# Patient Record
Sex: MALE | Race: WHITE | Employment: OTHER | URBAN - NONMETROPOLITAN AREA
[De-identification: names, ages, dates, MRNs, and addresses within clinical notes are randomized per-mention and may not be internally consistent; named-entity substitution may affect disease eponyms.]

---

## 2017-12-11 ENCOUNTER — OFFICE VISIT (OUTPATIENT)
Dept: OPHTHALMOLOGY | Age: 57
End: 2017-12-11
Payer: COMMERCIAL

## 2017-12-11 DIAGNOSIS — Z97.0 HISTORY OF EYE PROSTHESIS: ICD-10-CM

## 2017-12-11 DIAGNOSIS — H54.40: Primary | ICD-10-CM

## 2017-12-11 PROCEDURE — 99204 OFFICE O/P NEW MOD 45 MIN: CPT | Performed by: OPHTHALMOLOGY

## 2017-12-11 RX ORDER — BENOXINATE HCL/FLUORESCEIN SOD 0.4%-0.25%
1 DROPS OPHTHALMIC (EYE) ONCE
Status: COMPLETED | OUTPATIENT
Start: 2017-12-11 | End: 2017-12-11

## 2017-12-11 RX ORDER — LOVASTATIN 20 MG/1
20 TABLET ORAL
COMMUNITY
Start: 2017-10-31

## 2017-12-11 RX ORDER — LISINOPRIL 10 MG/1
10 TABLET ORAL
COMMUNITY
Start: 2017-10-31

## 2017-12-11 RX ORDER — TROPICAMIDE 10 MG/ML
1 SOLUTION/ DROPS OPHTHALMIC ONCE
Status: COMPLETED | OUTPATIENT
Start: 2017-12-11 | End: 2017-12-11

## 2017-12-11 RX ORDER — PHENYLEPHRINE HCL 2.5 %
1 DROPS OPHTHALMIC (EYE) ONCE
Status: COMPLETED | OUTPATIENT
Start: 2017-12-11 | End: 2017-12-11

## 2017-12-11 RX ADMIN — TROPICAMIDE 1 DROP: 10 SOLUTION/ DROPS OPHTHALMIC at 09:15

## 2017-12-11 RX ADMIN — Medication 1 DROP: at 08:51

## 2017-12-11 RX ADMIN — Medication 1 DROP: at 08:50

## 2017-12-11 ASSESSMENT — SLIT LAMP EXAM - LIDS: COMMENTS: NORMAL

## 2017-12-11 ASSESSMENT — CONF VISUAL FIELD
OD_INFERIOR_TEMPORAL_RESTRICTION: 1
OD_INFERIOR_NASAL_RESTRICTION: 1
OD_SUPERIOR_TEMPORAL_RESTRICTION: 1
METHOD: COUNTING FINGERS
OD_SUPERIOR_NASAL_RESTRICTION: 1
OS_NORMAL: 1

## 2017-12-11 ASSESSMENT — ENCOUNTER SYMPTOMS
GASTROINTESTINAL NEGATIVE: 1
RESPIRATORY NEGATIVE: 1

## 2017-12-11 ASSESSMENT — TONOMETRY
IOP_METHOD: APPLANATION W FLURESS DROP
OS_IOP_MMHG: 23

## 2017-12-11 ASSESSMENT — VISUAL ACUITY
METHOD: SNELLEN - LINEAR
OS_SC: 20/20
OD_SC: PROTHESIS

## 2017-12-11 NOTE — PATIENT INSTRUCTIONS
If you have any problems or concerns before your next scheduled appointment, please call the office at [phone].

## 2017-12-11 NOTE — PROGRESS NOTES
He is here for a complete exam.     Past Medical History:   Diagnosis Date    Cerebral artery occlusion with cerebral infarction [facility])           Current Outpatient Prescriptions:     lisinopril (PRINIVIL;ZESTRIL) 10 MG tablet, Take 10 mg by mouth, Disp: , Rfl:     lovastatin (MEVACOR) 20 MG tablet, Take 20 mg by mouth, Disp: , Rfl:     Neomycin-Polymyxin-Dexameth 0.1 % OINT, Place 0.5 inches into the right eye 2 times daily, Disp: 1 Tube, Rfl: 11     No Known Allergies   ROS  Review of Systems   Constitutional: Negative. HENT: Negative. Respiratory: Negative. Cardiovascular: Negative. Gastrointestinal: Negative. Musculoskeletal: Negative. Skin: Negative. Neurological: Negative. Endo/Heme/Allergies: Negative. Main Ophthalmology Exam     External Exam       Right Left    External Prothesis  Normal    OD    Conformer in orbit in good position. All conjunctival tissue healthy. No evidence of infection or tissue breakdown.            Slit Lamp Exam       Right Left    Lids/Lashes  Normal    Conjunctiva/Sclera  White and quiet    Cornea  Clear    Anterior Chamber  Deep and quiet    Iris  Round and reactive          Fundus Exam       Right Left    Disc  Normal    C/D Ratio  0.4    Macula  Normal    Vessels  AV nicking    Periphery  Normal                   Tonometry     Tonometry (Applanation w Fluress drop, 8:55 AM)       Right Left    Pressure N/A 23              Visual Acuity     Visual Acuity (Snellen - Linear)       Right Left    Dist sc prothesis 20/20              Pupils     Pupils       React APD    Right      Left Slow None               Confrontational Visual Fields     Visual Fields (Counting fingers)       Right Left      Full    Restrictions Total superior temporal, inferior temporal, superior nasal, inferior nasal deficiencies                Extraocular Movement     Extraocular Movement       Right Left     -- -- --   --  --   -- -- --    0 0 0   0  0   0 0 0 IMPRESSION:  1. Profound vision impairment, one eye     2. History of eye prosthesis         PLAN:  Discussed all below with patient. 1. Profound vision impairment, one eye  Stable    2. History of eye prosthesis  Stable    There are no Patient Instructions on file for this visit. No Follow-up on file.

## 2019-06-04 RX ORDER — OMEPRAZOLE 20 MG/1
20 CAPSULE, DELAYED RELEASE ORAL DAILY
COMMUNITY

## 2019-06-10 ENCOUNTER — INITIAL CONSULT (OUTPATIENT)
Dept: SURGERY | Age: 59
End: 2019-06-10
Payer: COMMERCIAL

## 2019-06-10 VITALS
HEART RATE: 102 BPM | TEMPERATURE: 98.8 F | SYSTOLIC BLOOD PRESSURE: 140 MMHG | HEIGHT: 69 IN | DIASTOLIC BLOOD PRESSURE: 80 MMHG | WEIGHT: 167 LBS | BODY MASS INDEX: 24.73 KG/M2

## 2019-06-10 DIAGNOSIS — D49.0 PANCREATIC NEOPLASM: ICD-10-CM

## 2019-06-10 DIAGNOSIS — K85.20 ALCOHOL-INDUCED ACUTE PANCREATITIS WITHOUT INFECTION OR NECROSIS: Primary | ICD-10-CM

## 2019-06-10 PROCEDURE — 99204 OFFICE O/P NEW MOD 45 MIN: CPT | Performed by: SURGERY

## 2019-06-10 RX ORDER — ASPIRIN 325 MG
325 TABLET ORAL DAILY
Status: ON HOLD | COMMUNITY
End: 2022-07-24 | Stop reason: HOSPADM

## 2019-06-10 SDOH — HEALTH STABILITY: MENTAL HEALTH: HOW MANY STANDARD DRINKS CONTAINING ALCOHOL DO YOU HAVE ON A TYPICAL DAY?: 5 OR 6

## 2019-06-10 SDOH — HEALTH STABILITY: MENTAL HEALTH: HOW OFTEN DO YOU HAVE A DRINK CONTAINING ALCOHOL?: 4 OR MORE TIMES A WEEK

## 2019-06-10 ASSESSMENT — ENCOUNTER SYMPTOMS
ABDOMINAL DISTENTION: 0
TROUBLE SWALLOWING: 0
DIARRHEA: 0
BACK PAIN: 0
SORE THROAT: 0
ABDOMINAL PAIN: 1
COUGH: 0
NAUSEA: 0
VOICE CHANGE: 0
EYES NEGATIVE: 1
VOMITING: 0
WHEEZING: 0
SHORTNESS OF BREATH: 0
CONSTIPATION: 1
HEMATOCHEZIA: 0
BLOOD IN STOOL: 0
CHOKING: 0
CHEST TIGHTNESS: 0
ANAL BLEEDING: 0

## 2019-06-10 ASSESSMENT — CROHNS DISEASE ACTIVITY INDEX (CDAI): CDAI SCORE: 0

## 2019-06-10 NOTE — PROGRESS NOTES
Subjective:      Patient ID: Abel Light is a 61 y.o. male. Abdominal Pain   This is a new problem. The current episode started 1 to 4 weeks ago. The problem occurs constantly. The most recent episode lasted 1 week. The pain is located in the epigastric region. The pain is at a severity of 5/10. The quality of the pain is a sensation of fullness. The abdominal pain radiates to the back. Associated symptoms include anorexia, constipation and weight loss. Pertinent negatives include no arthralgias, diarrhea, dysuria, fever, headaches, hematochezia, hematuria, melena, myalgias, nausea or vomiting. He has tried proton pump inhibitors (\"soda water\") for the symptoms. The treatment provided no relief. Prior diagnostic workup includes CT scan and ultrasound. There is no history of abdominal surgery, colon cancer, Crohn's disease, gallstones, GERD, irritable bowel syndrome, pancreatitis, PUD or ulcerative colitis. He was not on omeprazole until the pain started. Past Medical History:   Diagnosis Date    [facility]erebral artery occlusion with cerebral infarction ([facility] [address]     History of alcohol use     Hyperlipidemia     Hypertension     Substance abuse ([facility] [address]     Tobacco abuse      Past Surgical History:   Procedure Laterality Date    EYE SURGERY       Current Outpatient Medications   Medication Sig Dispense Refill    aspirin 325 MG tablet Take 325 mg by mouth daily      Cholecalciferol (VITAMIN D3) 1000 units CAPS Take 1,000 Units by mouth daily      Coenzyme Q10 (CO Q 10) 100 MG CAPS Take 400 mg by mouth daily      lisinopril (PRINIVIL;ZESTRIL) 10 MG tablet Take 10 mg by mouth      lovastatin (MEVACOR) 20 MG tablet Take 20 mg by mouth      omeprazole (PRILOSEC) 20 MG delayed release capsule Take 20 mg by mouth daily      Neomycin-Polymyxin-Dexameth 0.1 % OINT Place 0.5 inches into the right eye 2 times daily 1 Tube 11     No current facility-administered medications for this visit.       No Known are normal. Abnormal dentition ( edentulous). Tonsils are 1+ on the right. Tonsils are 1+ on the left. Eyes: Left eye exhibits no chemosis, no discharge, no exudate and no hordeolum. No foreign body present in the left eye. Left conjunctiva is not injected. Left conjunctiva has no hemorrhage. Left eye exhibits normal extraocular motion and no nystagmus. Left pupil is round and reactive. Pupils are unequal.       Neck: Normal range of motion. Neck supple. No tracheal deviation present. No thyromegaly present. Cardiovascular: Normal rate, regular rhythm and normal heart sounds. Pulmonary/Chest: Effort normal and breath sounds normal. No respiratory distress. Abdominal: Soft. Bowel sounds are normal. He exhibits no distension and no mass. There is no tenderness. There is no rebound and no guarding. No hernia. Lymphadenopathy:     He has no cervical adenopathy. He has no axillary adenopathy. Right: No inguinal and no supraclavicular adenopathy present. Left: No inguinal and no supraclavicular adenopathy present. Neurological: He is alert and oriented to person, place, and time. Skin: Skin is warm and dry. He is not diaphoretic. Psychiatric: He has a normal mood and affect. His behavior is normal. Judgment and thought content normal.     Imaging results and lab results from [facility] review    Assessment:      1)History of mild pancreatitis - likely alcoholic pancreatitis in absence of gall stone. He is on lisinopril and lovastatin, which could be associated with pancreatitis. Given his history of alcohol consumption, alcoholic pancreatitis is much more likely. He does not have gall stones. 2) Pseudocyst vs pancreatitic mass seen on CT scan      Plan:      1) MRCP - to evaluate mass vs pseudocyst.  If it is a pseudocyst, it is small and likely to resolve.   If there is still a question of this being a pancreatic tumor will need endoscopic ultrasound and possible biopsy  2) His best strategy to avoid future problems is to continue to abstain from alcohol. Recurrent episodes of pancreatitis can lead to chronic pancreatitis which in turn can be associated with difficult to control chronic pain and exocrine pancreatic insufficiency. This was explained to him and his sisters in lay terms. 3) I don't believe an EGD will be helpful at this time.         Zulema Bang MD

## 2019-06-11 DIAGNOSIS — Z13.89 ENCOUNTER FOR IMAGING TO SCREEN FOR METAL PRIOR TO MAGNETIC RESONANCE IMAGING (MRI): Primary | ICD-10-CM

## 2019-06-11 NOTE — PROGRESS NOTES
Patient is scheduled for MRCP, per dr Melania Nieto, at [facility] Saturday 6/15/2019 @ 715 a.m. With npo for 6 hours prior, patient verbalized understanding, all his paperwork faxed to [phone].

## 2019-06-24 DIAGNOSIS — D49.0 PANCREATIC NEOPLASM: ICD-10-CM

## 2019-06-24 DIAGNOSIS — K85.20 ALCOHOL-INDUCED ACUTE PANCREATITIS WITHOUT INFECTION OR NECROSIS: ICD-10-CM

## 2022-07-22 ENCOUNTER — APPOINTMENT (OUTPATIENT)
Dept: CT IMAGING | Age: 62
End: 2022-07-22
Payer: COMMERCIAL

## 2022-07-22 ENCOUNTER — APPOINTMENT (OUTPATIENT)
Dept: GENERAL RADIOLOGY | Age: 62
End: 2022-07-22
Payer: COMMERCIAL

## 2022-07-22 ENCOUNTER — HOSPITAL ENCOUNTER (OUTPATIENT)
Age: 62
Setting detail: OBSERVATION
Discharge: HOME HEALTH | End: 2022-07-24
Attending: EMERGENCY MEDICINE | Admitting: INTERNAL MEDICINE
Payer: COMMERCIAL

## 2022-07-22 DIAGNOSIS — R61 DIAPHORESIS: ICD-10-CM

## 2022-07-22 DIAGNOSIS — E86.0 DEHYDRATION: ICD-10-CM

## 2022-07-22 DIAGNOSIS — R11.2 NAUSEA AND VOMITING, INTRACTABILITY OF VOMITING NOT SPECIFIED, UNSPECIFIED VOMITING TYPE: ICD-10-CM

## 2022-07-22 DIAGNOSIS — R53.1 GENERALIZED WEAKNESS: Primary | ICD-10-CM

## 2022-07-22 LAB
-: NORMAL
ABSOLUTE EOS #: 0.14 K/UL (ref 0–0.44)
ABSOLUTE IMMATURE GRANULOCYTE: 0.17 K/UL (ref 0–0.3)
ABSOLUTE LYMPH #: 1.57 K/UL (ref 1.1–3.7)
ABSOLUTE MONO #: 0.51 K/UL (ref 0.1–1.2)
ALBUMIN SERPL-MCNC: 4.2 G/DL (ref 3.5–5.2)
ALBUMIN/GLOBULIN RATIO: 1.4 (ref 1–2.5)
ALP BLD-CCNC: 69 U/L (ref 40–129)
ALT SERPL-CCNC: 15 U/L (ref 5–41)
AMPHETAMINE SCREEN URINE: NEGATIVE
AMYLASE: 86 U/L (ref 28–100)
ANION GAP SERPL CALCULATED.3IONS-SCNC: 18 MMOL/L (ref 9–17)
AST SERPL-CCNC: 12 U/L
BACTERIA: NORMAL
BARBITURATE SCREEN URINE: NEGATIVE
BASOPHILS # BLD: 1 % (ref 0–2)
BASOPHILS ABSOLUTE: 0.11 K/UL (ref 0–0.2)
BENZODIAZEPINE SCREEN, URINE: NEGATIVE
BILIRUB SERPL-MCNC: 0.15 MG/DL (ref 0.3–1.2)
BILIRUBIN DIRECT: 0.08 MG/DL
BILIRUBIN URINE: NEGATIVE
BILIRUBIN, INDIRECT: 0.07 MG/DL (ref 0–1)
BUN BLDV-MCNC: 25 MG/DL (ref 8–23)
BUPRENORPHINE URINE: NEGATIVE
CALCIUM SERPL-MCNC: 9.3 MG/DL (ref 8.6–10.4)
CANNABINOID SCREEN URINE: NEGATIVE
CHLORIDE BLD-SCNC: 102 MMOL/L (ref 98–107)
CO2: 18 MMOL/L (ref 20–31)
COCAINE METABOLITE, URINE: NEGATIVE
CREAT SERPL-MCNC: 1.44 MG/DL (ref 0.7–1.2)
EOSINOPHILS RELATIVE PERCENT: 1 % (ref 1–4)
EPITHELIAL CELLS UA: NORMAL /HPF (ref 0–5)
GFR AFRICAN AMERICAN: >60 ML/MIN
GFR NON-AFRICAN AMERICAN: 50 ML/MIN
GFR SERPL CREATININE-BSD FRML MDRD: ABNORMAL ML/MIN/{1.73_M2}
GLUCOSE BLD-MCNC: 146 MG/DL (ref 70–99)
GLUCOSE URINE: NEGATIVE
HCT VFR BLD CALC: 41 % (ref 40.7–50.3)
HEMOGLOBIN: 13.5 G/DL (ref 13–17)
IMMATURE GRANULOCYTES: 1 %
KETONES, URINE: ABNORMAL
LACTIC ACID, SEPSIS: 1.7 MMOL/L (ref 0.5–1.9)
LACTIC ACID, SEPSIS: 3.6 MMOL/L (ref 0.5–1.9)
LEUKOCYTE ESTERASE, URINE: NEGATIVE
LIPASE: 25 U/L (ref 13–60)
LYMPHOCYTES # BLD: 11 % (ref 24–43)
MCH RBC QN AUTO: 30.5 PG (ref 25.2–33.5)
MCHC RBC AUTO-ENTMCNC: 32.9 G/DL (ref 25.2–33.5)
MCV RBC AUTO: 92.8 FL (ref 82.6–102.9)
METHADONE SCREEN, URINE: NEGATIVE
METHAMPHETAMINE, URINE: NEGATIVE
MONOCYTES # BLD: 4 % (ref 3–12)
NITRITE, URINE: NEGATIVE
NRBC AUTOMATED: 0 PER 100 WBC
OPIATES, URINE: NEGATIVE
OXYCODONE SCREEN URINE: NEGATIVE
PDW BLD-RTO: 13.2 % (ref 11.8–14.4)
PH UA: 5.5 (ref 5–6)
PHENCYCLIDINE, URINE: NEGATIVE
PLATELET # BLD: 196 K/UL (ref 138–453)
PMV BLD AUTO: 10.8 FL (ref 8.1–13.5)
POTASSIUM SERPL-SCNC: 4 MMOL/L (ref 3.7–5.3)
PROPOXYPHENE, URINE: NEGATIVE
PROTEIN UA: NEGATIVE
RBC # BLD: 4.42 M/UL (ref 4.21–5.77)
RBC UA: NORMAL /HPF (ref 0–4)
REASON FOR REJECTION: NORMAL
REASON FOR REJECTION: NORMAL
SARS-COV-2, RAPID: NOT DETECTED
SEG NEUTROPHILS: 82 % (ref 36–65)
SEGMENTED NEUTROPHILS ABSOLUTE COUNT: 11.28 K/UL (ref 1.5–8.1)
SODIUM BLD-SCNC: 138 MMOL/L (ref 135–144)
SPECIFIC GRAVITY UA: 1.02 (ref 1.01–1.02)
SPECIMEN DESCRIPTION: NORMAL
TEST INFORMATION: NORMAL
TOTAL PROTEIN: 7.2 G/DL (ref 6.4–8.3)
TRICYCLIC ANTIDEPRESSANTS, UR: NEGATIVE
TROPONIN, HIGH SENSITIVITY: 7 NG/L (ref 0–22)
TROPONIN, HIGH SENSITIVITY: 7 NG/L (ref 0–22)
URINE HGB: ABNORMAL
UROBILINOGEN, URINE: NORMAL
WBC # BLD: 13.8 K/UL (ref 3.5–11.3)
WBC UA: NORMAL /HPF (ref 0–4)
ZZ NTE CLEAN UP: ORDERED TEST: NORMAL
ZZ NTE CLEAN UP: ORDERED TEST: NORMAL
ZZ NTE WITH NAME CLEAN UP: SPECIMEN SOURCE: NORMAL
ZZ NTE WITH NAME CLEAN UP: SPECIMEN SOURCE: NORMAL

## 2022-07-22 PROCEDURE — 96374 THER/PROPH/DIAG INJ IV PUSH: CPT

## 2022-07-22 PROCEDURE — 82150 ASSAY OF AMYLASE: CPT

## 2022-07-22 PROCEDURE — 6360000002 HC RX W HCPCS: Performed by: EMERGENCY MEDICINE

## 2022-07-22 PROCEDURE — 80143 DRUG ASSAY ACETAMINOPHEN: CPT

## 2022-07-22 PROCEDURE — 84484 ASSAY OF TROPONIN QUANT: CPT

## 2022-07-22 PROCEDURE — 96361 HYDRATE IV INFUSION ADD-ON: CPT

## 2022-07-22 PROCEDURE — 70450 CT HEAD/BRAIN W/O DYE: CPT

## 2022-07-22 PROCEDURE — 6360000004 HC RX CONTRAST MEDICATION: Performed by: EMERGENCY MEDICINE

## 2022-07-22 PROCEDURE — 36415 COLL VENOUS BLD VENIPUNCTURE: CPT

## 2022-07-22 PROCEDURE — G0480 DRUG TEST DEF 1-7 CLASSES: HCPCS

## 2022-07-22 PROCEDURE — 87635 SARS-COV-2 COVID-19 AMP PRB: CPT

## 2022-07-22 PROCEDURE — 81001 URINALYSIS AUTO W/SCOPE: CPT

## 2022-07-22 PROCEDURE — 85025 COMPLETE CBC W/AUTO DIFF WBC: CPT

## 2022-07-22 PROCEDURE — 80179 DRUG ASSAY SALICYLATE: CPT

## 2022-07-22 PROCEDURE — 99285 EMERGENCY DEPT VISIT HI MDM: CPT

## 2022-07-22 PROCEDURE — 2580000003 HC RX 258: Performed by: EMERGENCY MEDICINE

## 2022-07-22 PROCEDURE — 2709999900 CT ABDOMEN PELVIS W IV CONTRAST

## 2022-07-22 PROCEDURE — 80306 DRUG TEST PRSMV INSTRMNT: CPT

## 2022-07-22 PROCEDURE — 80307 DRUG TEST PRSMV CHEM ANLYZR: CPT

## 2022-07-22 PROCEDURE — 93005 ELECTROCARDIOGRAM TRACING: CPT | Performed by: EMERGENCY MEDICINE

## 2022-07-22 PROCEDURE — 80053 COMPREHEN METABOLIC PANEL: CPT

## 2022-07-22 PROCEDURE — 71045 X-RAY EXAM CHEST 1 VIEW: CPT

## 2022-07-22 PROCEDURE — 82248 BILIRUBIN DIRECT: CPT

## 2022-07-22 PROCEDURE — 83690 ASSAY OF LIPASE: CPT

## 2022-07-22 PROCEDURE — 83605 ASSAY OF LACTIC ACID: CPT

## 2022-07-22 RX ORDER — 0.9 % SODIUM CHLORIDE 0.9 %
1000 INTRAVENOUS SOLUTION INTRAVENOUS ONCE
Status: COMPLETED | OUTPATIENT
Start: 2022-07-22 | End: 2022-07-22

## 2022-07-22 RX ORDER — NICOTINE 21 MG/24HR
1 PATCH, TRANSDERMAL 24 HOURS TRANSDERMAL DAILY
Status: DISCONTINUED | OUTPATIENT
Start: 2022-07-22 | End: 2022-07-24 | Stop reason: HOSPADM

## 2022-07-22 RX ORDER — ONDANSETRON 2 MG/ML
4 INJECTION INTRAMUSCULAR; INTRAVENOUS ONCE
Status: COMPLETED | OUTPATIENT
Start: 2022-07-22 | End: 2022-07-22

## 2022-07-22 RX ADMIN — IOPAMIDOL 100 ML: 755 INJECTION, SOLUTION INTRAVENOUS at 21:06

## 2022-07-22 RX ADMIN — SODIUM CHLORIDE 1000 ML: 9 INJECTION, SOLUTION INTRAVENOUS at 17:42

## 2022-07-22 RX ADMIN — ONDANSETRON 4 MG: 2 INJECTION INTRAMUSCULAR; INTRAVENOUS at 19:55

## 2022-07-22 RX ADMIN — SODIUM CHLORIDE 1000 ML: 9 INJECTION, SOLUTION INTRAVENOUS at 20:55

## 2022-07-22 ASSESSMENT — ENCOUNTER SYMPTOMS
VOMITING: 1
SHORTNESS OF BREATH: 0
ABDOMINAL PAIN: 0
BLOOD IN STOOL: 0
WHEEZING: 0
SORE THROAT: 0
DIARRHEA: 1
TROUBLE SWALLOWING: 0
BACK PAIN: 0
CONSTIPATION: 0
NAUSEA: 1

## 2022-07-22 ASSESSMENT — PAIN - FUNCTIONAL ASSESSMENT: PAIN_FUNCTIONAL_ASSESSMENT: NONE - DENIES PAIN

## 2022-07-22 NOTE — ED PROVIDER NOTES
[id number] [facility]  eMERGENCY dEPARTMENT eNCOUnter      Pt Name: Joey Ballard  MRN: [id number]  [place] 1960  Date of evaluation: 7/22/2022      CHIEF COMPLAINT       Chief Complaint   Patient presents with    Emesis     Vomitting and diarrhea, very diaphoretic, started today         HISTORY OF PRESENT ILLNESS    Joey Ballard is a 58 y.o. male who presents chief complaint of nausea vomiting and diaphoretic patient went to the restaurant today he said he got very diaphoretic nausea vomiting had diarrhea no pain he has had a history of pancreatitis as well is the substance use in the past squad arrived he was very diaphoretic but vitals were good blood sugar was in the 140s      REVIEW OF SYSTEMS       Review of Systems   Constitutional:  Positive for diaphoresis and fatigue. Negative for chills and fever. HENT:  Negative for congestion, dental problem, sore throat and trouble swallowing. Eyes:  Negative for visual disturbance. Respiratory:  Negative for shortness of breath and wheezing. Cardiovascular:  Negative for chest pain, palpitations and leg swelling. Gastrointestinal:  Positive for diarrhea, nausea and vomiting. Negative for abdominal pain, blood in stool and constipation. Genitourinary:  Negative for difficulty urinating and dysuria. Musculoskeletal:  Negative for back pain, joint swelling and neck pain. Skin:  Negative for rash. Neurological:  Negative for dizziness, syncope, weakness and headaches. Hematological:  Negative for adenopathy. Does not bruise/bleed easily. Psychiatric/Behavioral:  Negative for confusion and suicidal ideas. PAST MEDICAL HISTORY    has a past medical history of Cerebral artery occlusion with cerebral infarction [facility]), History of alcohol use, Hyperlipidemia, Hypertension, Substance abuse ([address] and Tobacco abuse. SURGICAL HISTORY      has a past surgical history that includes eye surgery.     CURRENT MEDICATIONS       Previous Medications    ASPIRIN 325 MG TABLET    Take 325 mg by mouth daily    CHOLECALCIFEROL (VITAMIN D3) 1000 UNITS CAPS    Take 1,000 Units by mouth daily    COENZYME Q10 (CO Q 10) 100 MG CAPS    Take 400 mg by mouth daily    LISINOPRIL (PRINIVIL;ZESTRIL) 10 MG TABLET    Take 10 mg by mouth    LOVASTATIN (MEVACOR) 20 MG TABLET    Take 20 mg by mouth    NEOMYCIN-POLYMYXIN-DEXAMETH 0.1 % OINT    Place 0.5 inches into the right eye 2 times daily    OMEPRAZOLE (PRILOSEC) 20 MG DELAYED RELEASE CAPSULE    Take 20 mg by mouth daily       ALLERGIES     has No Known Allergies. FAMILY HISTORY     has no family status information on file. family history is not on file. SOCIAL HISTORY      reports that he has been smoking cigarettes. He started smoking about 44 years ago. He has never used smokeless tobacco. He reports current alcohol use of about 35.0 standard drinks per week. He reports that he does not use drugs. PHYSICAL EXAM     INITIAL VITALS:  height is 5' 9\" (1.753 m) and weight is 150 lb (68 kg). His temperature is 96.2 °F (35.7 °C) (abnormal). His blood pressure is 144/77 (abnormal) and his pulse is 64. His respiration is 18 and oxygen saturation is 99%. Physical Exam  Constitutional:       Appearance: He is well-developed. He is ill-appearing and diaphoretic. HENT:      Head: Normocephalic and atraumatic. Right Ear: External ear normal.      Left Ear: External ear normal.   Eyes:      Pupils: Pupils are equal, round, and reactive to light. Cardiovascular:      Rate and Rhythm: Normal rate and regular rhythm. Pulses: Normal pulses. Heart sounds: Normal heart sounds. Pulmonary:      Effort: Pulmonary effort is normal.      Breath sounds: Normal breath sounds. Abdominal:      General: Bowel sounds are normal. There is no distension. Palpations: Abdomen is soft. Tenderness: There is no abdominal tenderness. Musculoskeletal:         General: Normal range of motion. Cervical back: Normal range of motion and neck supple. Skin:     General: Skin is warm. Neurological:      General: No focal deficit present. Mental Status: He is alert and oriented to person, place, and time. Psychiatric:         Behavior: Behavior normal.           DIFFERENTIAL DIAGNOSIS/ MDM:     Vomiting diarrhea diaphoresis we will treat symptomatically cardiac work-up    DIAGNOSTIC RESULTS     EKG: All EKG's are interpreted by the Emergency Department Physician who either signs or Co-signs this chart in the absence of a cardiologist.  Sinus bradycardia rate of 55 bpm GA was 166 ms QRS durations 80 ms QT corrected 431 ms axis for the QRS is 37 there is no acute ST or T wave changes seen      RADIOLOGY:   I directly visualized the following  images and reviewed the radiologist interpretations:          ED BEDSIDE ULTRASOUND:       LABS:  Labs Reviewed   CBC WITH AUTO DIFFERENTIAL - Abnormal; Notable for the following components:       Result Value    WBC 13.8 (*)     Seg Neutrophils 82 (*)     Lymphocytes 11 (*)     Immature Granulocytes 1 (*)     Segs Absolute 11.28 (*)     All other components within normal limits   LACTATE, SEPSIS - Abnormal; Notable for the following components:    Lactic Acid, Sepsis 3.6 (*)     All other components within normal limits   COMPREHENSIVE METABOLIC W/ BILI PROFILE W/ REFLEX TO MG - Abnormal; Notable for the following components:     Total Bilirubin 0.15 (*)     BUN 25 (*)     Creatinine 1.44 (*)     Glucose 146 (*)     CO2 18 (*)     Anion Gap 18 (*)     GFR Non- 50 (*)     All other components within normal limits   TOX SCR, BLD, ED - Abnormal; Notable for the following components:    Acetaminophen Level <5 (*)     Ethanol 18 (*)     Salicylate Lvl <1 (*)     All other components within normal limits   COVID-19, RAPID   SPECIMEN REJECTION   AMYLASE   LIPASE   TROPONIN   LACTATE, SEPSIS   URINALYSIS WITH REFLEX TO CULTURE   URINE DRUG SCREEN TROPONIN       Elevated lactic acid    EMERGENCY DEPARTMENT COURSE:   Vitals:    Vitals:    07/22/22 1731 07/22/22 1734   BP:  (!) 144/77   Pulse: 64    Resp: 18    Temp:  (!) 96.2 °F (35.7 °C)   SpO2: 99%    Weight: 150 lb (68 kg)    Height: 5' 9\" (1.753 m)      -------------------------  BP: (!) 144/77, Temp: (!) 96.2 °F (35.7 °C), Heart Rate: 64, Resp: 18        Re-evaluation Notes    Resting comfortably    CRITICAL CARE:   None        CONSULTS:      PROCEDURES:  None    FINAL IMPRESSION    No diagnosis found. DISPOSITION/PLAN   DISPOSITION   Care is passed to the oncoming physician at the end of my shift 1930 hrs. Condition on Disposition    Stable    PATIENT REFERRED TO:  No follow-up provider specified. DISCHARGE MEDICATIONS:  New Prescriptions    No medications on file       (Please note that portions of this note were completed with a voice recognition program.  Efforts were made to edit the dictations but occasionally words are mis-transcribed.)    Nii Aragon MD,, MD, F.A.A.E.M.   Attending Emergency Physician                           Nii Aragon MD  07/22/22 0136

## 2022-07-23 PROBLEM — I63.50 CEREBRAL ARTERY OCCLUSION WITH CEREBRAL INFARCTION (HCC): Status: ACTIVE | Noted: 2022-07-23

## 2022-07-23 PROBLEM — K85.90 ACUTE PANCREATITIS: Status: RESOLVED | Noted: 2019-08-21 | Resolved: 2022-07-23

## 2022-07-23 PROBLEM — Z97.0 PROSTHETIC EYE GLOBE: Status: ACTIVE | Noted: 2022-07-23

## 2022-07-23 PROBLEM — K44.9 HIATAL HERNIA: Status: ACTIVE | Noted: 2022-07-23

## 2022-07-23 PROBLEM — N18.30 CKD (CHRONIC KIDNEY DISEASE) STAGE 3, GFR 30-59 ML/MIN (HCC): Status: ACTIVE | Noted: 2022-07-23

## 2022-07-23 PROBLEM — E11.22 TYPE 2 DIABETES MELLITUS WITH STAGE 3 CHRONIC KIDNEY DISEASE, WITH LONG-TERM CURRENT USE OF INSULIN (HCC): Status: ACTIVE | Noted: 2022-07-23

## 2022-07-23 PROBLEM — F10.20 ALCOHOLISM (HCC): Status: ACTIVE | Noted: 2022-07-23

## 2022-07-23 PROBLEM — K85.90 ACUTE PANCREATITIS: Status: ACTIVE | Noted: 2019-08-21

## 2022-07-23 PROBLEM — N18.30 TYPE 2 DIABETES MELLITUS WITH STAGE 3 CHRONIC KIDNEY DISEASE, WITH LONG-TERM CURRENT USE OF INSULIN (HCC): Status: ACTIVE | Noted: 2022-07-23

## 2022-07-23 PROBLEM — R11.2 INTRACTABLE VOMITING WITH NAUSEA: Status: ACTIVE | Noted: 2022-07-23

## 2022-07-23 PROBLEM — F41.9 ANXIETY: Status: ACTIVE | Noted: 2022-07-23

## 2022-07-23 PROBLEM — Z79.4 TYPE 2 DIABETES MELLITUS WITH STAGE 3 CHRONIC KIDNEY DISEASE, WITH LONG-TERM CURRENT USE OF INSULIN (HCC): Status: ACTIVE | Noted: 2022-07-23

## 2022-07-23 PROBLEM — K86.0 ALCOHOL-INDUCED CHRONIC PANCREATITIS (HCC): Status: ACTIVE | Noted: 2022-07-23

## 2022-07-23 PROBLEM — K21.00 GASTROESOPHAGEAL REFLUX DISEASE WITH ESOPHAGITIS: Status: ACTIVE | Noted: 2022-07-23

## 2022-07-23 LAB
ABSOLUTE EOS #: <0.03 K/UL (ref 0–0.44)
ABSOLUTE IMMATURE GRANULOCYTE: 0.03 K/UL (ref 0–0.3)
ABSOLUTE LYMPH #: 0.83 K/UL (ref 1.1–3.7)
ABSOLUTE MONO #: 0.22 K/UL (ref 0.1–1.2)
ACETAMINOPHEN LEVEL: <5 UG/ML (ref 10–30)
ANION GAP SERPL CALCULATED.3IONS-SCNC: 14 MMOL/L (ref 9–17)
BASOPHILS # BLD: 0 % (ref 0–2)
BASOPHILS ABSOLUTE: 0.03 K/UL (ref 0–0.2)
BUN BLDV-MCNC: 23 MG/DL (ref 8–23)
BUN/CREAT BLD: 17 (ref 9–20)
CALCIUM SERPL-MCNC: 9.1 MG/DL (ref 8.6–10.4)
CHLORIDE BLD-SCNC: 104 MMOL/L (ref 98–107)
CO2: 21 MMOL/L (ref 20–31)
CREAT SERPL-MCNC: 1.35 MG/DL (ref 0.7–1.2)
EOSINOPHILS RELATIVE PERCENT: 0 % (ref 1–4)
ETHANOL: 18 MG/DL
GFR AFRICAN AMERICAN: >60 ML/MIN
GFR NON-AFRICAN AMERICAN: 54 ML/MIN
GFR SERPL CREATININE-BSD FRML MDRD: ABNORMAL ML/MIN/{1.73_M2}
GLUCOSE BLD-MCNC: 145 MG/DL (ref 75–110)
GLUCOSE BLD-MCNC: 164 MG/DL (ref 75–110)
GLUCOSE BLD-MCNC: 176 MG/DL (ref 75–110)
GLUCOSE BLD-MCNC: 222 MG/DL (ref 70–99)
HCT VFR BLD CALC: 38.1 % (ref 40.7–50.3)
HEMOGLOBIN: 13.3 G/DL (ref 13–17)
IMMATURE GRANULOCYTES: 0 %
LIPASE: 23 U/L (ref 13–60)
LYMPHOCYTES # BLD: 9 % (ref 24–43)
MCH RBC QN AUTO: 32 PG (ref 25.2–33.5)
MCHC RBC AUTO-ENTMCNC: 34.9 G/DL (ref 25.2–33.5)
MCV RBC AUTO: 91.8 FL (ref 82.6–102.9)
MONOCYTES # BLD: 3 % (ref 3–12)
NRBC AUTOMATED: 0 PER 100 WBC
PDW BLD-RTO: 13.1 % (ref 11.8–14.4)
PLATELET # BLD: 178 K/UL (ref 138–453)
PMV BLD AUTO: 11.2 FL (ref 8.1–13.5)
POTASSIUM SERPL-SCNC: 4.4 MMOL/L (ref 3.7–5.3)
RBC # BLD: 4.15 M/UL (ref 4.21–5.77)
SALICYLATE LEVEL: <1 MG/DL (ref 3–10)
SEG NEUTROPHILS: 88 % (ref 36–65)
SEGMENTED NEUTROPHILS ABSOLUTE COUNT: 7.79 K/UL (ref 1.5–8.1)
SODIUM BLD-SCNC: 139 MMOL/L (ref 135–144)
TOXIC TRICYCLIC SC,BLOOD: NEGATIVE
WBC # BLD: 8.9 K/UL (ref 3.5–11.3)

## 2022-07-23 PROCEDURE — 2580000003 HC RX 258: Performed by: NURSE PRACTITIONER

## 2022-07-23 PROCEDURE — 6370000000 HC RX 637 (ALT 250 FOR IP): Performed by: NURSE PRACTITIONER

## 2022-07-23 PROCEDURE — 99219 PR INITIAL OBSERVATION CARE/DAY 50 MINUTES: CPT | Performed by: NURSE PRACTITIONER

## 2022-07-23 PROCEDURE — 80048 BASIC METABOLIC PNL TOTAL CA: CPT

## 2022-07-23 PROCEDURE — 96376 TX/PRO/DX INJ SAME DRUG ADON: CPT

## 2022-07-23 PROCEDURE — 96375 TX/PRO/DX INJ NEW DRUG ADDON: CPT

## 2022-07-23 PROCEDURE — 94760 N-INVAS EAR/PLS OXIMETRY 1: CPT

## 2022-07-23 PROCEDURE — G0378 HOSPITAL OBSERVATION PER HR: HCPCS

## 2022-07-23 PROCEDURE — 6370000000 HC RX 637 (ALT 250 FOR IP): Performed by: INTERNAL MEDICINE

## 2022-07-23 PROCEDURE — 6360000002 HC RX W HCPCS: Performed by: NURSE PRACTITIONER

## 2022-07-23 PROCEDURE — 96361 HYDRATE IV INFUSION ADD-ON: CPT

## 2022-07-23 PROCEDURE — 96372 THER/PROPH/DIAG INJ SC/IM: CPT

## 2022-07-23 PROCEDURE — 51702 INSERT TEMP BLADDER CATH: CPT

## 2022-07-23 PROCEDURE — 82947 ASSAY GLUCOSE BLOOD QUANT: CPT

## 2022-07-23 PROCEDURE — A4216 STERILE WATER/SALINE, 10 ML: HCPCS | Performed by: NURSE PRACTITIONER

## 2022-07-23 PROCEDURE — C9113 INJ PANTOPRAZOLE SODIUM, VIA: HCPCS | Performed by: NURSE PRACTITIONER

## 2022-07-23 PROCEDURE — 83690 ASSAY OF LIPASE: CPT

## 2022-07-23 PROCEDURE — 36415 COLL VENOUS BLD VENIPUNCTURE: CPT

## 2022-07-23 PROCEDURE — 85025 COMPLETE CBC W/AUTO DIFF WBC: CPT

## 2022-07-23 PROCEDURE — 6370000000 HC RX 637 (ALT 250 FOR IP): Performed by: EMERGENCY MEDICINE

## 2022-07-23 RX ORDER — POLYETHYLENE GLYCOL 3350 17 G/17G
17 POWDER, FOR SOLUTION ORAL DAILY PRN
Status: DISCONTINUED | OUTPATIENT
Start: 2022-07-23 | End: 2022-07-24 | Stop reason: HOSPADM

## 2022-07-23 RX ORDER — ALBUTEROL SULFATE 90 UG/1
2 AEROSOL, METERED RESPIRATORY (INHALATION) EVERY 6 HOURS PRN
Status: DISCONTINUED | OUTPATIENT
Start: 2022-07-23 | End: 2022-07-24 | Stop reason: HOSPADM

## 2022-07-23 RX ORDER — LISINOPRIL 5 MG/1
10 TABLET ORAL DAILY
Status: DISCONTINUED | OUTPATIENT
Start: 2022-07-23 | End: 2022-07-24 | Stop reason: HOSPADM

## 2022-07-23 RX ORDER — TAMSULOSIN HYDROCHLORIDE 0.4 MG/1
0.4 CAPSULE ORAL DAILY
Status: DISCONTINUED | OUTPATIENT
Start: 2022-07-23 | End: 2022-07-24 | Stop reason: HOSPADM

## 2022-07-23 RX ORDER — ONDANSETRON 4 MG/1
4 TABLET, ORALLY DISINTEGRATING ORAL EVERY 8 HOURS PRN
Status: DISCONTINUED | OUTPATIENT
Start: 2022-07-23 | End: 2022-07-24 | Stop reason: HOSPADM

## 2022-07-23 RX ORDER — DEXTROSE MONOHYDRATE 100 MG/ML
INJECTION, SOLUTION INTRAVENOUS CONTINUOUS PRN
Status: DISCONTINUED | OUTPATIENT
Start: 2022-07-23 | End: 2022-07-24 | Stop reason: HOSPADM

## 2022-07-23 RX ORDER — SODIUM CHLORIDE 9 MG/ML
25 INJECTION, SOLUTION INTRAVENOUS PRN
Status: DISCONTINUED | OUTPATIENT
Start: 2022-07-23 | End: 2022-07-24 | Stop reason: HOSPADM

## 2022-07-23 RX ORDER — LORAZEPAM 2 MG/ML
3 INJECTION INTRAMUSCULAR
Status: DISCONTINUED | OUTPATIENT
Start: 2022-07-23 | End: 2022-07-24 | Stop reason: HOSPADM

## 2022-07-23 RX ORDER — SODIUM CHLORIDE 0.9 % (FLUSH) 0.9 %
5-40 SYRINGE (ML) INJECTION EVERY 12 HOURS SCHEDULED
Status: DISCONTINUED | OUTPATIENT
Start: 2022-07-23 | End: 2022-07-24 | Stop reason: HOSPADM

## 2022-07-23 RX ORDER — SODIUM CHLORIDE 0.9 % (FLUSH) 0.9 %
5-40 SYRINGE (ML) INJECTION PRN
Status: DISCONTINUED | OUTPATIENT
Start: 2022-07-23 | End: 2022-07-24 | Stop reason: HOSPADM

## 2022-07-23 RX ORDER — ONDANSETRON 2 MG/ML
4 INJECTION INTRAMUSCULAR; INTRAVENOUS EVERY 6 HOURS PRN
Status: DISCONTINUED | OUTPATIENT
Start: 2022-07-23 | End: 2022-07-24 | Stop reason: HOSPADM

## 2022-07-23 RX ORDER — ENOXAPARIN SODIUM 100 MG/ML
40 INJECTION SUBCUTANEOUS DAILY
Status: DISCONTINUED | OUTPATIENT
Start: 2022-07-23 | End: 2022-07-24 | Stop reason: HOSPADM

## 2022-07-23 RX ORDER — LORAZEPAM 1 MG/1
4 TABLET ORAL
Status: DISCONTINUED | OUTPATIENT
Start: 2022-07-23 | End: 2022-07-24 | Stop reason: HOSPADM

## 2022-07-23 RX ORDER — INSULIN GLARGINE 100 [IU]/ML
5 INJECTION, SOLUTION SUBCUTANEOUS DAILY
Status: DISCONTINUED | OUTPATIENT
Start: 2022-07-23 | End: 2022-07-24 | Stop reason: HOSPADM

## 2022-07-23 RX ORDER — LORAZEPAM 2 MG/ML
1 INJECTION INTRAMUSCULAR
Status: DISCONTINUED | OUTPATIENT
Start: 2022-07-23 | End: 2022-07-24 | Stop reason: HOSPADM

## 2022-07-23 RX ORDER — INSULIN LISPRO 100 [IU]/ML
0-4 INJECTION, SOLUTION INTRAVENOUS; SUBCUTANEOUS NIGHTLY
Status: DISCONTINUED | OUTPATIENT
Start: 2022-07-23 | End: 2022-07-24 | Stop reason: HOSPADM

## 2022-07-23 RX ORDER — LORAZEPAM 1 MG/1
3 TABLET ORAL
Status: DISCONTINUED | OUTPATIENT
Start: 2022-07-23 | End: 2022-07-24 | Stop reason: HOSPADM

## 2022-07-23 RX ORDER — INSULIN LISPRO 100 [IU]/ML
0-8 INJECTION, SOLUTION INTRAVENOUS; SUBCUTANEOUS
Status: DISCONTINUED | OUTPATIENT
Start: 2022-07-23 | End: 2022-07-24 | Stop reason: HOSPADM

## 2022-07-23 RX ORDER — GLIMEPIRIDE 2 MG/1
1 TABLET ORAL
COMMUNITY
Start: 2022-05-31 | End: 2022-11-27

## 2022-07-23 RX ORDER — ACETAMINOPHEN 325 MG/1
650 TABLET ORAL EVERY 6 HOURS PRN
Status: DISCONTINUED | OUTPATIENT
Start: 2022-07-23 | End: 2022-07-24 | Stop reason: HOSPADM

## 2022-07-23 RX ORDER — SODIUM CHLORIDE 9 MG/ML
INJECTION, SOLUTION INTRAVENOUS CONTINUOUS
Status: DISCONTINUED | OUTPATIENT
Start: 2022-07-23 | End: 2022-07-24 | Stop reason: HOSPADM

## 2022-07-23 RX ORDER — LORAZEPAM 1 MG/1
2 TABLET ORAL
Status: DISCONTINUED | OUTPATIENT
Start: 2022-07-23 | End: 2022-07-24 | Stop reason: HOSPADM

## 2022-07-23 RX ORDER — PROMETHAZINE HYDROCHLORIDE 25 MG/ML
6.25 INJECTION, SOLUTION INTRAMUSCULAR; INTRAVENOUS EVERY 6 HOURS PRN
Status: DISCONTINUED | OUTPATIENT
Start: 2022-07-23 | End: 2022-07-24 | Stop reason: HOSPADM

## 2022-07-23 RX ORDER — LORAZEPAM 1 MG/1
1 TABLET ORAL
Status: DISCONTINUED | OUTPATIENT
Start: 2022-07-23 | End: 2022-07-24 | Stop reason: HOSPADM

## 2022-07-23 RX ORDER — LORAZEPAM 2 MG/ML
2 INJECTION INTRAMUSCULAR
Status: DISCONTINUED | OUTPATIENT
Start: 2022-07-23 | End: 2022-07-24 | Stop reason: HOSPADM

## 2022-07-23 RX ORDER — ACETAMINOPHEN 650 MG/1
650 SUPPOSITORY RECTAL EVERY 6 HOURS PRN
Status: DISCONTINUED | OUTPATIENT
Start: 2022-07-23 | End: 2022-07-24 | Stop reason: HOSPADM

## 2022-07-23 RX ORDER — HYDROXYZINE HYDROCHLORIDE 25 MG/1
25 TABLET, FILM COATED ORAL EVERY 8 HOURS PRN
Status: ON HOLD | COMMUNITY
Start: 2022-05-31 | End: 2022-07-24 | Stop reason: HOSPADM

## 2022-07-23 RX ORDER — PROCHLORPERAZINE EDISYLATE 5 MG/ML
10 INJECTION INTRAMUSCULAR; INTRAVENOUS EVERY 6 HOURS PRN
Status: DISCONTINUED | OUTPATIENT
Start: 2022-07-23 | End: 2022-07-24 | Stop reason: HOSPADM

## 2022-07-23 RX ORDER — THIAMINE HYDROCHLORIDE 100 MG/ML
100 INJECTION, SOLUTION INTRAMUSCULAR; INTRAVENOUS DAILY
Status: DISCONTINUED | OUTPATIENT
Start: 2022-07-23 | End: 2022-07-24 | Stop reason: HOSPADM

## 2022-07-23 RX ORDER — LORAZEPAM 2 MG/ML
4 INJECTION INTRAMUSCULAR
Status: DISCONTINUED | OUTPATIENT
Start: 2022-07-23 | End: 2022-07-24 | Stop reason: HOSPADM

## 2022-07-23 RX ADMIN — SODIUM CHLORIDE, PRESERVATIVE FREE 10 ML: 5 INJECTION INTRAVENOUS at 09:01

## 2022-07-23 RX ADMIN — ENOXAPARIN SODIUM 40 MG: 100 INJECTION SUBCUTANEOUS at 08:59

## 2022-07-23 RX ADMIN — LISINOPRIL 10 MG: 5 TABLET ORAL at 08:59

## 2022-07-23 RX ADMIN — ONDANSETRON 4 MG: 2 INJECTION INTRAMUSCULAR; INTRAVENOUS at 09:07

## 2022-07-23 RX ADMIN — THIAMINE HYDROCHLORIDE 100 MG: 100 INJECTION, SOLUTION INTRAMUSCULAR; INTRAVENOUS at 09:00

## 2022-07-23 RX ADMIN — INSULIN LISPRO 2 UNITS: 100 INJECTION, SOLUTION INTRAVENOUS; SUBCUTANEOUS at 08:48

## 2022-07-23 RX ADMIN — TAMSULOSIN HYDROCHLORIDE 0.4 MG: 0.4 CAPSULE ORAL at 14:00

## 2022-07-23 RX ADMIN — INSULIN GLARGINE 5 UNITS: 100 INJECTION, SOLUTION SUBCUTANEOUS at 08:48

## 2022-07-23 RX ADMIN — SODIUM CHLORIDE: 9 INJECTION, SOLUTION INTRAVENOUS at 17:13

## 2022-07-23 RX ADMIN — SODIUM CHLORIDE, PRESERVATIVE FREE 40 MG: 5 INJECTION INTRAVENOUS at 02:52

## 2022-07-23 RX ADMIN — SODIUM CHLORIDE: 9 INJECTION, SOLUTION INTRAVENOUS at 02:52

## 2022-07-23 ASSESSMENT — PAIN SCALES - GENERAL
PAINLEVEL_OUTOF10: 0
PAINLEVEL_OUTOF10: 0

## 2022-07-23 ASSESSMENT — LIFESTYLE VARIABLES
HOW MANY STANDARD DRINKS CONTAINING ALCOHOL DO YOU HAVE ON A TYPICAL DAY: 5 OR 6
HOW OFTEN DO YOU HAVE A DRINK CONTAINING ALCOHOL: 4 OR MORE TIMES A WEEK

## 2022-07-23 NOTE — ED PROVIDER NOTES
ADDENDUM:      Care of this patient was assumed from Dr. Lamont Posadas. The patient was seen for Emesis (Vomitting and diarrhea, very diaphoretic, started today)  . The patient's initial evaluation and plan have been discussed with the prior provider who initially evaluated the patient. Nursing Notes, Past Medical Hx, Past Surgical Hx, Social Hx, Family Hx, Medications and Allergies, and  were all reviewed. Diagnostic Results     EKG   Twelve-lead EKG reviewed. Please see Dr. Sandra Aceves interpretation. Repeat twelve-lead EKG time 17: 55 normal sinus rhythm at 63 bpm.  Normal intervals normal axis. No acute ST ovation depressions or T wave inversion interpretation is a normal twelve-lead EKG. RADIOLOGY:  CT HEAD WO CONTRAST    Result Date: 7/22/2022  EXAMINATION: CT OF THE HEAD WITHOUT CONTRAST  7/22/2022 9:01 pm TECHNIQUE: CT of the head was performed without the administration of intravenous contrast. Automated exposure control, iterative reconstruction, and/or weight based adjustment of the mA/kV was utilized to reduce the radiation dose to as low as reasonably achievable. COMPARISON: None. HISTORY: ORDERING SYSTEM PROVIDED HISTORY: ataxia TECHNOLOGIST PROVIDED HISTORY: ataxia Decision Support Exception - unselect if not a suspected or confirmed emergency medical condition->Emergency Medical Condition (MA) Reason for Exam: Ataxia FINDINGS: BRAIN/VENTRICLES: Low-attenuation in the anterior left corona radiata toe with large mint of the adjacent horn of the left lateral ventricle consistent with a remote infarct. There are few small remote basal ganglia lacunar infarcts. There is no acute intracranial hemorrhage, mass effect or midline shift. No abnormal extra-axial fluid collection. The gray-white differentiation is maintained without evidence of an acute infarct. There is no evidence of hydrocephalus. ORBITS: The visualized portion of the orbits demonstrate no acute abnormality.   There is a right eye prosthesis and there is a remote left orbital medial blowout fracture deformity. SINUSES: The visualized paranasal sinuses and mastoid air cells demonstrate no acute abnormality. SOFT TISSUES/SKULL:  No acute abnormality of the visualized skull or soft tissues. Chronic ischemic changes as above with no acute intracranial abnormality. CT ABDOMEN PELVIS W IV CONTRAST Additional Contrast? None    Result Date: 7/22/2022  EXAMINATION: CT OF THE ABDOMEN AND PELVIS WITH CONTRAST 7/22/2022 8:02 pm TECHNIQUE: CT of the abdomen and pelvis was performed with the administration of intravenous contrast. Multiplanar reformatted images are provided for review. Automated exposure control, iterative reconstruction, and/or weight based adjustment of the mA/kV was utilized to reduce the radiation dose to as low as reasonably achievable. COMPARISON: None. HISTORY: ORDERING SYSTEM PROVIDED HISTORY: intractible N/V / leukocytosis TECHNOLOGIST PROVIDED HISTORY: intractible N/V / leukocytosis Decision Support Exception - unselect if not a suspected or confirmed emergency medical condition->Emergency Medical Condition (MA) Reason for Exam: Intractable nausea and vomiting, leukocytosis FINDINGS: Lower Chest: Heart size is normal.  The visualized lung bases are grossly clear. Organs: The liver, spleen, pancreas, kidneys, gallbladder, and adrenal glands appear normal. GI/Bowel: Diverticulosis of the cecum and of the sigmoid colon. The stomach and the small and large bowel loops are otherwise normal in caliber, contour, and morphology, without acute abnormality. No dilated loops or areas of bowel wall thickening. The appendix is normal. Peritoneum/Retroperitoneum: There is no free fluid or extraluminal gas. No enlarged or suspicious mesenteric or retroperitoneal lymphadenopathy.  The abdominal aorta is patent with saccular aneurysmal enlargement of the infrarenal aorta, proximally measuring 3.1 cm in AP diameter and distally measuring 3.0 cm in AP diameter. The iliac arteries are heavily calcified although patent and of normal caliber. Pelvis: No pelvic free fluid or enlarged or suspicious pelvic or inguinal lymphadenopathy. The prostate gland is not enlarged. The urinary bladder and the pelvic bowel loops are unremarkable. Bones/Soft Tissues: Degenerative changes are present throughout the lumbar spine. No acute fracture. No abdominal wall or inguinal hernia. Sigmoid diverticulosis. 3.1 cm infrarenal abdominal aneurysm, without acute rupture. Otherwise, unremarkable contrast-enhanced CT examination of the abdomen and pelvis, without finding to account for the patient's symptoms. XR CHEST PORTABLE    Result Date: 7/23/2022  EXAMINATION: ONE XRAY VIEW OF THE CHEST 7/22/2022 11:45 pm COMPARISON: None. HISTORY: ORDERING SYSTEM PROVIDED HISTORY: crackles right lung base TECHNOLOGIST PROVIDED HISTORY: crackles right lung base Reason for Exam: Crackles right lung base FINDINGS: Heart size and configuration are normal.  Hilar and mediastinal structures are unremarkable. The lungs are clear. No pneumothorax or pleural fluid. No acute bone finding. No acute cardiopulmonary disease. LABS:   Results for orders placed or performed during the hospital encounter of 07/22/22   COVID-19, Rapid    Specimen: Nasopharyngeal Swab   Result Value Ref Range    Specimen Description . NASOPHARYNGEAL SWAB     SARS-CoV-2, Rapid Not Detected Not Detected   CBC with Auto Differential   Result Value Ref Range    WBC 13.8 (H) 3.5 - 11.3 k/uL    RBC 4.42 4.21 - 5.77 m/uL    Hemoglobin 13.5 13.0 - 17.0 g/dL    Hematocrit 41.0 40.7 - 50.3 %    MCV 92.8 82.6 - 102.9 fL    MCH 30.5 25.2 - 33.5 pg    MCHC 32.9 25.2 - 33.5 g/dL    RDW 13.2 11.8 - 14.4 %    Platelets 843 867 - 855 k/uL    MPV 10.8 8.1 - 13.5 fL    NRBC Automated 0.0 0.0 per 100 WBC    Seg Neutrophils 82 (H) 36 - 65 %    Lymphocytes 11 (L) 24 - 43 %    Monocytes 4 3 - 12 % Eosinophils % 1 1 - 4 %    Basophils 1 0 - 2 %    Immature Granulocytes 1 (H) 0 %    Segs Absolute 11.28 (H) 1.50 - 8.10 k/uL    Absolute Lymph # 1.57 1.10 - 3.70 k/uL    Absolute Mono # 0.51 0.10 - 1.20 k/uL    Absolute Eos # 0.14 0.00 - 0.44 k/uL    Basophils Absolute 0.11 0.00 - 0.20 k/uL    Absolute Immature Granulocyte 0.17 0.00 - 0.30 k/uL   Lactate, Sepsis   Result Value Ref Range    Lactic Acid, Sepsis 3.6 (H) 0.5 - 1.9 mmol/L   Urinalysis with Reflex to Culture    Specimen: Urine   Result Value Ref Range    Glucose, Ur NEGATIVE NEGATIVE    Bilirubin Urine NEGATIVE NEGATIVE    Ketones, Urine 1+ (A) NEGATIVE    Specific Gravity, UA 1.020 1.010 - 1.025    Urine Hgb TRACE (A) NEGATIVE    pH, UA 5.5 5.0 - 6.0    Protein, UA NEGATIVE NEGATIVE    Urobilinogen, Urine Normal Normal    Nitrite, Urine NEGATIVE NEGATIVE    Leukocyte Esterase, Urine NEGATIVE NEGATIVE   Urine Drug Screen   Result Value Ref Range    Amphetamine Screen, Ur NEGATIVE NEGATIVE    Barbiturate Screen, Ur NEGATIVE NEGATIVE    Benzodiazepine Screen, Urine NEGATIVE NEGATIVE    Cocaine Metabolite, Urine NEGATIVE NEGATIVE    Methadone Screen, Urine NEGATIVE NEGATIVE    Opiates, Urine NEGATIVE NEGATIVE    Phencyclidine, Urine NEGATIVE NEGATIVE    Propoxyphene, Urine NEGATIVE NEGATIVE    Cannabinoid Scrn, Ur NEGATIVE NEGATIVE    Oxycodone Screen, Ur NEGATIVE NEGATIVE    Methamphetamine, Urine NEGATIVE NEGATIVE    Tricyclic Antidepressants, Urine NEGATIVE NEGATIVE    Buprenorphine Urine NEGATIVE NEGATIVE    Test Information       The following threshold concentrations are established for the drug assays:   SPECIMEN REJECTION   Result Value Ref Range    Specimen Source ER DRAW     Ordered Test EDTOX,NERY,CPBILX,LIP,TROPI     Reason for Rejection Unable to perform testing: Specimen hemolyzed.     Amylase   Result Value Ref Range    Amylase 86 28 - 100 U/L   Comprehensive Metabolic w/ Bili Profile w/ Reflex to MG   Result Value Ref Range    Albumin 4.2 3.5 LORazepam (ATIVAN) tablet 2 mg     LORazepam (ATIVAN) injection 2 mg     LORazepam (ATIVAN) tablet 3 mg     LORazepam (ATIVAN) injection 3 mg     LORazepam (ATIVAN) tablet 4 mg     LORazepam (ATIVAN) injection 4 mg       Medical Decision Making      Case signed out to my Dr. Balderas . I have reviewed the history and physical with the patient and family. Patient felt fine until he went into a [employer] today to eat. He had profound nausea vomiting weakness and diaphoresis. He has had persistent symptoms. Nursing staff reported that he had received 4 mg of IV Zofran per squad. 2 hours into his ED stay he is having intractable nausea vomiting he is given another 4 mg of IV Zofran. He continues to have difficulty when trying to get up he looked as though he was going to collapse when he tried to get up to use the toilet from the wheelchair. Patient has history of previous stroke. Denies any chest pain pressure cough cold or congestion or abdominal pain. On physical examination the patient appears older than stated age. He appears mildly diaphoretic. The left pupil is PERRL extraocular movements are intact. The right eye is a prosthetic. There is no facial droop speech is normal.  No tongue deviation. Strength in the bilateral upper extremities is intact. Patient does have eversion of the left foot and mild weakness he states that this is residual from previous stroke. Heart sounds within normal limits. Occasional rhonchi in the lung bases. Abdomen is soft and nontender. No peripheral edema. No calf tenderness or swelling. Skin appears pale and clammy. Patient had initial elevated lactic acid, also leukocytosis. Patient had improvement of his lactic acid with IV fluids however patient still remained symptomatic and was unable to ambulate. Chest x-ray is negative for pneumonia. CT scan without acute changes he does have a 3.1 cm infrarenal AAA without evidence of rupture.   Head CT without evidence of acute acute infarction or hemorrhage. Case was discussed with Hong Hernandez CNP in the hospital service who accepted the patient for observation status. Disposition     FINAL IMPRESSION      1. Nausea and vomiting, intractability of vomiting not specified, unspecified vomiting type    2. Generalized weakness    3. Diaphoresis    4. Dehydration          DISPOSITION/PLAN   DISPOSITION Admitted 07/23/2022 01:36:08 AM      PATIENT REFERRED TO:  No follow-up provider specified.     DISCHARGE MEDICATIONS:  Current Discharge Medication List                (Please note that portions of this note were completed with a voice recognition program.  Efforts were made to edit the dictations but occasionally words are mis-transcribed.)    Nicolle Ortiz MD, [facility]  Attending Emergency Medicine Physician                  Nicolle Ortiz MD  07/23/22 1976

## 2022-07-23 NOTE — H&P
Acute pancreatitis 8/21/2019    Cerebral artery occlusion with cerebral infarction [facility])     History of alcohol use     Hyperlipidemia     Hypertension     Substance abuse ([facility] [address]     Tobacco abuse            Past Surgical History:   Procedure Laterality Date    EYE SURGERY         Allergies:    Patient has no known allergies. Social History:    reports that he has been smoking cigarettes. He started smoking about 44 years ago. He has been smoking an average of 1 pack per day. He has never used smokeless tobacco. He reports current alcohol use of about 35.0 standard drinks per week. He reports that he does not currently use drugs. Family History:   family history includes No Known Problems in his mother, sister, and sister. REVIEW OF SYSTEMS:  See HPI and problem list; otherwise no other new complaints with respect to eyes, ENT, neck, pulmonary, coronary, chest, GI, , endocrine, musculoskeletal, hematologic, lymphatic, allergic/immunologic, neurologic, psychiatric, or skin. Code status: patient/family wishes for Full Code at this time. PHYSICAL EXAM:  Vitals:  BP (!) 177/80   Pulse 65   Temp (!) 96.2 °F (35.7 °C)   Resp 19   Ht 5' 9\" (1.753 m)   Wt 150 lb (68 kg)   SpO2 97%   BMI 22.15 kg/m²     General: awake, alert, and cooperative, appears older then stated age  [de-identified]:  prosthetic right eye present, Mucosa Pink, Moist, External nose normal, Normocephalic, Atraumatic, and Neck with full ROM, edentulous  Neck: Supple, Carotid Pulses Present, No Masses, Tenderness, Nodularity, and No Lymphadenopathy  Chest/Lungs: Clear to Auscultation without Rales, Rhonchi, or Wheezes, Prolonged Expiratory Phase, and Respirations even and unlabored  Cardiac: Regular Rate and Rhythm and Pedal Pulses Palpable Bilaterally  GI/Abdomen:  Bowel Sounds Present and Soft, Non-tender, without Guarding or Rebound Tenderness  : Not examined  Extremities/Musculoskeletal: Generalized weakness  Skin:  diaphoretic at times (prior to emesis) and No Cyanosis  Neuro:  history of CVA with minimal right sided weakness, right eye with prosthetic globe, Alert and Oriented, to Person, to Time, to Place, and to Situation  Psychiatric: Irritable at times, anxious at times      LABS:    CBC with Differential:    Lab Results   Component Value Date/Time    WBC 13.8 07/22/2022 05:50 PM    RBC 4.42 07/22/2022 05:50 PM    HGB 13.5 07/22/2022 05:50 PM    HCT 41.0 07/22/2022 05:50 PM     07/22/2022 05:50 PM    MCV 92.8 07/22/2022 05:50 PM    MCH 30.5 07/22/2022 05:50 PM    MCHC 32.9 07/22/2022 05:50 PM    RDW 13.2 07/22/2022 05:50 PM    LYMPHOPCT 11 07/22/2022 05:50 PM    MONOPCT 4 07/22/2022 05:50 PM    BASOPCT 1 07/22/2022 05:50 PM    MONOSABS 0.51 07/22/2022 05:50 PM    LYMPHSABS 1.57 07/22/2022 05:50 PM    EOSABS 0.14 07/22/2022 05:50 PM    BASOSABS 0.11 07/22/2022 05:50 PM     CMP:    Lab Results   Component Value Date/Time     07/22/2022 06:15 PM    K 4.0 07/22/2022 06:15 PM     07/22/2022 06:15 PM    CO2 18 07/22/2022 06:15 PM    BUN 25 07/22/2022 06:15 PM    CREATININE 1.44 07/22/2022 06:15 PM    GFRAA >60 07/22/2022 06:15 PM    LABGLOM 50 07/22/2022 06:15 PM    GLUCOSE 146 07/22/2022 06:15 PM    PROT 7.2 07/22/2022 06:15 PM    LABALBU 4.2 07/22/2022 06:15 PM    CALCIUM 9.3 07/22/2022 06:15 PM    BILITOT 0.15 07/22/2022 06:15 PM    ALKPHOS 69 07/22/2022 06:15 PM    AST 12 07/22/2022 06:15 PM    ALT 15 07/22/2022 06:15 PM       ASSESSMENT:      Patient Active Problem List   Diagnosis    Essential hypertension, benign    Hyperlipidemia    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 Intractable nausea and vomiting -- unknown etiology -- differentials include viral gastroenteritis, food poisoning, early pancreatitis, and BPPV -- will allow clear liquid diet as tolerated overnight, IV hydration, antiemetics prn, recheck lipase in AM, PPI -- consider scheduled antivert if vertigo suspected  2. DMII -- carb controlled diet, insulins, monitor and titrate prn  3. CKD stage 3 -- stable, monitor I&O and renal function  4. CIWA protocol  5. Tobacco use -- cessation advised, nicotine patch ordered  6. Home medications reviewed  7. DVT prophylaxis -- daily lovenox  8. See orders     Note that over 50 minutes was spent in reviewing and obtaining history, physical examination and evaluation of the patient, placing orders, providing education, coordinating care, reviewing test results, documenting in the medical record, and discussion/communicating with patient/caregiver/family.     KIRSTIE Hanna - CNP, FNP-BC  3:00 AM  7/23/2022

## 2022-07-23 NOTE — PLAN OF CARE
Problem: Discharge Planning  Goal: Discharge to home or other facility with appropriate resources  Outcome: Progressing  Flowsheets  Taken 7/23/2022 0242  Discharge to home or other facility with appropriate resources:   Identify barriers to discharge with patient and caregiver   Refer to discharge planning if patient needs post-hospital services based on physician order or complex needs related to functional status, cognitive ability or social support system  Taken 7/23/2022 0240  Discharge to home or other facility with appropriate resources:   Identify barriers to discharge with patient and caregiver   Arrange for needed discharge resources and transportation as appropriate     Problem: Safety - Adult  Goal: Free from fall injury  Outcome: Progressing

## 2022-07-23 NOTE — PROGRESS NOTES
Physical Therapy  Facility/Department: [facility]  PROGRESSIVE CARE  Daily Treatment Note  NAME: Keke Morrow  : 1960  MRN: [id number]    Date of Service: 2022    Discharge Recommendations:           Patient Diagnosis(es): The primary encounter diagnosis was Nausea and vomiting, intractability of vomiting not specified, unspecified vomiting type. Diagnoses of Generalized weakness, Diaphoresis, and Dehydration were also pertinent to this visit. Subjective The patient refused therapy this morning due to still vomiting and not feeling well. Will attempt to evaluate again tomorrow.                   Education: educated patient we will try to evaluate tomorrow       Therapy Time   Individual Concurrent Group Co-treatment   Time In           Time Out           Minutes                   Edu Brooke PT

## 2022-07-23 NOTE — PROGRESS NOTES
Admission reviewed. Had inability to urinate with diaphoresis this AM-- Rose placed--about 1000 cc returned. Flomax started. Counseled on Complete EtoH cessation. ? Cause of abrupt n/V--associated weakness--- now much improved. No dizziness. ? Viral, ? Food poisoning. PT to see.

## 2022-07-23 NOTE — PLAN OF CARE
Problem: Discharge Planning  Goal: Discharge to home or other facility with appropriate resources  7/23/2022 1329 by Shreya Modi RN  Outcome: Progressing  Flowsheets (Taken 7/23/2022 0914)  Discharge to home or other facility with appropriate resources: Identify barriers to discharge with patient and caregiver  Note:  and  are assigned to case and are available upon request.  [id number] by Diana Ortega RN  Outcome: Progressing  Flowsheets  Taken 7/23/2022 0242  Discharge to home or other facility with appropriate resources:   Identify barriers to discharge with patient and caregiver   Refer to discharge planning if patient needs post-hospital services based on physician order or complex needs related to functional status, cognitive ability or social support system  Taken 7/23/2022 0240  Discharge to home or other facility with appropriate resources:   Identify barriers to discharge with patient and caregiver   Arrange for needed discharge resources and transportation as appropriate     Problem: Safety - Adult  Goal: Free from fall injury  7/23/2022 1329 by Shreya Modi RN  Outcome: Progressing  Note: Patient will remain free from falls. Bed is low and wheels are locked. Call light and personal items are within reach. Problem: Pain  Goal: Verbalizes/displays adequate comfort level or baseline comfort level  Outcome: Progressing  Note: Patient is able to use the 0-10 pain scale to rate pain. PRN meds. Problem: Skin/Tissue Integrity  Goal: Absence of new skin breakdown  Description: 1. Monitor for areas of redness and/or skin breakdown  2. Assess vascular access sites hourly  3. Every 4-6 hours minimum:  Change oxygen saturation probe site  4. Every 4-6 hours:  If on nasal continuous positive airway pressure, respiratory therapy assess nares and determine need for appliance change or resting period.   Outcome: Progressing  Note: Will continue to monitor for signs of skin breakdown and infection.

## 2022-07-24 VITALS
DIASTOLIC BLOOD PRESSURE: 72 MMHG | HEIGHT: 69 IN | WEIGHT: 152.6 LBS | HEART RATE: 54 BPM | SYSTOLIC BLOOD PRESSURE: 153 MMHG | OXYGEN SATURATION: 98 % | TEMPERATURE: 96.9 F | BODY MASS INDEX: 22.6 KG/M2 | RESPIRATION RATE: 17 BRPM

## 2022-07-24 LAB
ABSOLUTE EOS #: 0.03 K/UL (ref 0–0.44)
ABSOLUTE IMMATURE GRANULOCYTE: <0.03 K/UL (ref 0–0.3)
ABSOLUTE LYMPH #: 1.6 K/UL (ref 1.1–3.7)
ABSOLUTE MONO #: 0.52 K/UL (ref 0.1–1.2)
ALBUMIN SERPL-MCNC: 3.6 G/DL (ref 3.5–5.2)
ALBUMIN/GLOBULIN RATIO: 1.4 (ref 1–2.5)
ALP BLD-CCNC: 57 U/L (ref 40–129)
ALT SERPL-CCNC: 11 U/L (ref 5–41)
ANION GAP SERPL CALCULATED.3IONS-SCNC: 10 MMOL/L (ref 9–17)
AST SERPL-CCNC: 10 U/L
BASOPHILS # BLD: 1 % (ref 0–2)
BASOPHILS ABSOLUTE: 0.06 K/UL (ref 0–0.2)
BILIRUB SERPL-MCNC: 0.33 MG/DL (ref 0.3–1.2)
BUN BLDV-MCNC: 19 MG/DL (ref 8–23)
BUN/CREAT BLD: 14 (ref 9–20)
CALCIUM SERPL-MCNC: 8.8 MG/DL (ref 8.6–10.4)
CHLORIDE BLD-SCNC: 108 MMOL/L (ref 98–107)
CO2: 23 MMOL/L (ref 20–31)
CREAT SERPL-MCNC: 1.32 MG/DL (ref 0.7–1.2)
EKG ATRIAL RATE: 58 BPM
EKG ATRIAL RATE: 63 BPM
EKG P AXIS: 57 DEGREES
EKG P AXIS: 73 DEGREES
EKG P-R INTERVAL: 166 MS
EKG P-R INTERVAL: 182 MS
EKG Q-T INTERVAL: 420 MS
EKG Q-T INTERVAL: 440 MS
EKG QRS DURATION: 64 MS
EKG QRS DURATION: 80 MS
EKG QTC CALCULATION (BAZETT): 429 MS
EKG QTC CALCULATION (BAZETT): 431 MS
EKG R AXIS: 20 DEGREES
EKG R AXIS: 37 DEGREES
EKG T AXIS: 23 DEGREES
EKG T AXIS: 47 DEGREES
EKG VENTRICULAR RATE: 58 BPM
EKG VENTRICULAR RATE: 63 BPM
EOSINOPHILS RELATIVE PERCENT: 0 % (ref 1–4)
GFR AFRICAN AMERICAN: >60 ML/MIN
GFR NON-AFRICAN AMERICAN: 55 ML/MIN
GFR SERPL CREATININE-BSD FRML MDRD: ABNORMAL ML/MIN/{1.73_M2}
GLUCOSE BLD-MCNC: 126 MG/DL (ref 75–110)
GLUCOSE BLD-MCNC: 160 MG/DL (ref 70–99)
GLUCOSE BLD-MCNC: 222 MG/DL (ref 75–110)
HCT VFR BLD CALC: 35.4 % (ref 40.7–50.3)
HEMOGLOBIN: 11.8 G/DL (ref 13–17)
IMMATURE GRANULOCYTES: 0 %
LYMPHOCYTES # BLD: 22 % (ref 24–43)
MCH RBC QN AUTO: 30.8 PG (ref 25.2–33.5)
MCHC RBC AUTO-ENTMCNC: 33.3 G/DL (ref 25.2–33.5)
MCV RBC AUTO: 92.4 FL (ref 82.6–102.9)
MONOCYTES # BLD: 7 % (ref 3–12)
NRBC AUTOMATED: 0 PER 100 WBC
PDW BLD-RTO: 13.3 % (ref 11.8–14.4)
PLATELET # BLD: 158 K/UL (ref 138–453)
PMV BLD AUTO: 11.3 FL (ref 8.1–13.5)
POTASSIUM SERPL-SCNC: 4 MMOL/L (ref 3.7–5.3)
RBC # BLD: 3.83 M/UL (ref 4.21–5.77)
SEG NEUTROPHILS: 69 % (ref 36–65)
SEGMENTED NEUTROPHILS ABSOLUTE COUNT: 4.99 K/UL (ref 1.5–8.1)
SODIUM BLD-SCNC: 141 MMOL/L (ref 135–144)
TOTAL PROTEIN: 6.1 G/DL (ref 6.4–8.3)
WBC # BLD: 7.2 K/UL (ref 3.5–11.3)

## 2022-07-24 PROCEDURE — 6370000000 HC RX 637 (ALT 250 FOR IP): Performed by: INTERNAL MEDICINE

## 2022-07-24 PROCEDURE — 2580000003 HC RX 258: Performed by: NURSE PRACTITIONER

## 2022-07-24 PROCEDURE — 6360000002 HC RX W HCPCS: Performed by: NURSE PRACTITIONER

## 2022-07-24 PROCEDURE — 80053 COMPREHEN METABOLIC PANEL: CPT

## 2022-07-24 PROCEDURE — 96372 THER/PROPH/DIAG INJ SC/IM: CPT

## 2022-07-24 PROCEDURE — G0378 HOSPITAL OBSERVATION PER HR: HCPCS

## 2022-07-24 PROCEDURE — 6370000000 HC RX 637 (ALT 250 FOR IP): Performed by: NURSE PRACTITIONER

## 2022-07-24 PROCEDURE — 85025 COMPLETE CBC W/AUTO DIFF WBC: CPT

## 2022-07-24 PROCEDURE — C9113 INJ PANTOPRAZOLE SODIUM, VIA: HCPCS | Performed by: NURSE PRACTITIONER

## 2022-07-24 PROCEDURE — 96361 HYDRATE IV INFUSION ADD-ON: CPT

## 2022-07-24 PROCEDURE — 96376 TX/PRO/DX INJ SAME DRUG ADON: CPT

## 2022-07-24 PROCEDURE — 97161 PT EVAL LOW COMPLEX 20 MIN: CPT

## 2022-07-24 PROCEDURE — 51702 INSERT TEMP BLADDER CATH: CPT

## 2022-07-24 PROCEDURE — 82947 ASSAY GLUCOSE BLOOD QUANT: CPT

## 2022-07-24 PROCEDURE — 36415 COLL VENOUS BLD VENIPUNCTURE: CPT

## 2022-07-24 PROCEDURE — 94760 N-INVAS EAR/PLS OXIMETRY 1: CPT

## 2022-07-24 RX ORDER — NICOTINE 21 MG/24HR
1 PATCH, TRANSDERMAL 24 HOURS TRANSDERMAL DAILY
Qty: 30 PATCH | Refills: 3 | Status: SHIPPED | OUTPATIENT
Start: 2022-07-25

## 2022-07-24 RX ORDER — ALBUTEROL SULFATE 90 UG/1
2 AEROSOL, METERED RESPIRATORY (INHALATION) EVERY 6 HOURS PRN
Qty: 18 G | Refills: 3 | Status: SHIPPED | OUTPATIENT
Start: 2022-07-24 | End: 2022-08-23

## 2022-07-24 RX ORDER — TAMSULOSIN HYDROCHLORIDE 0.4 MG/1
0.4 CAPSULE ORAL DAILY
Qty: 30 CAPSULE | Refills: 3 | Status: SHIPPED | OUTPATIENT
Start: 2022-07-25 | End: 2022-07-27 | Stop reason: SDUPTHER

## 2022-07-24 RX ADMIN — SODIUM CHLORIDE, PRESERVATIVE FREE 40 MG: 5 INJECTION INTRAVENOUS at 08:11

## 2022-07-24 RX ADMIN — THIAMINE HYDROCHLORIDE 100 MG: 100 INJECTION, SOLUTION INTRAMUSCULAR; INTRAVENOUS at 08:09

## 2022-07-24 RX ADMIN — SODIUM CHLORIDE: 9 INJECTION, SOLUTION INTRAVENOUS at 06:14

## 2022-07-24 RX ADMIN — SODIUM CHLORIDE, PRESERVATIVE FREE 10 ML: 5 INJECTION INTRAVENOUS at 08:16

## 2022-07-24 RX ADMIN — TAMSULOSIN HYDROCHLORIDE 0.4 MG: 0.4 CAPSULE ORAL at 08:09

## 2022-07-24 RX ADMIN — ENOXAPARIN SODIUM 40 MG: 100 INJECTION SUBCUTANEOUS at 08:09

## 2022-07-24 RX ADMIN — SODIUM CHLORIDE: 9 INJECTION, SOLUTION INTRAVENOUS at 15:05

## 2022-07-24 RX ADMIN — LISINOPRIL 10 MG: 5 TABLET ORAL at 08:08

## 2022-07-24 RX ADMIN — INSULIN GLARGINE 5 UNITS: 100 INJECTION, SOLUTION SUBCUTANEOUS at 08:16

## 2022-07-24 ASSESSMENT — PAIN SCALES - GENERAL
PAINLEVEL_OUTOF10: 0
PAINLEVEL_OUTOF10: 0

## 2022-07-24 NOTE — DISCHARGE SUMMARY
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)    Tobacco use disorder    Alcoholism ([facility]    CKD (chronic kidney disease) stage 3, GFR 30-59 ml/min ([facility])    Gastroesophageal reflux disease with esophagitis    Anxiety    Prosthetic eye globe    Type 2 diabetes mellitus with stage 3 chronic kidney disease, with long-term current use of insulin ([facility])    Alcohol-induced chronic pancreatitis ([facility]    Hiatal hernia    Intractable vomiting with nausea    Cerebral artery occlusion with cerebral infarction [facility])        Admission Condition:  fair      Discharged Condition:  fair    Hospital Course/Treatments   admitted with h/o of intractable nausea and vomitting and some diaphoresis, pt was seen in [place] and admitted, pt has h/o of having eaten in [place] prior to start of symptoms, pt is heavy alcoholic and trying to quit drinking, pt was admitted and  was unable to viod, rao catheter was inserted, pt was started on flomax, pt has not had any of symptoms since pt has been in hospital, pt did well, pt had voiding trial which was not succesful, pt will go home with catheter and follow up with urologyin 1 week    Discharge Medications:         Medication List        START taking these medications      albuterol sulfate  (90 Base) MCG/ACT inhaler  Commonly known as: PROVENTIL;VENTOLIN;PROAIR  Inhale 2 puffs into the lungs every 6 hours as needed for Wheezing or Shortness of Breath     nicotine 14 MG/24HR  Commonly known as: NICODERM CQ  Place 1 patch onto the skin in the morning. Start taking on: 2022     tamsulosin 0.4 MG capsule  Commonly known as: FLOMAX  Take 1 capsule by mouth in the morning.   Start taking on: July 25, 2022            CONTINUE taking these medications      Co Q 10 100 MG Caps     glimepiride 2 MG tablet  Commonly known as: AMARYL     lisinopril 10 MG tablet  Commonly known as: PRINIVIL;ZESTRIL     lovastatin 20 MG tablet  Commonly known as: MEVACOR     metFORMIN 1000 MG tablet  Commonly known as: GLUCOPHAGE     omeprazole 20 MG delayed release capsule  Commonly known as: PRILOSEC     Vitamin D3 25 MCG (1000 UT) Caps            STOP taking these medications      aspirin 325 MG tablet     hydrOXYzine HCl 25 MG tablet  Commonly known as: ATARAX     Neomycin-Polymyxin-Dexameth 0.1 % Oint               Where to Get Your Medications        These medications were sent to [address] [place] [address] Ivonne Frankel [phone]  [place] [address], [facility] [address]      Phone: [phone]   nicotine 14 MG/24HR       You can get these medications from any pharmacy    Bring a paper prescription for each of these medications  albuterol sulfate  (90 Base) MCG/ACT inhaler  tamsulosin 0.4 MG capsule         Consults:  IP CONSULT TO SOCIAL WORK    Significant Diagnostic Studies:  CT HEAD WO CONTRAST    Result Date: 7/22/2022  EXAMINATION: CT OF THE HEAD WITHOUT CONTRAST  7/22/2022 9:01 pm TECHNIQUE: CT of the head was performed without the administration of intravenous contrast. Automated exposure control, iterative reconstruction, and/or weight based adjustment of the mA/kV was utilized to reduce the radiation dose to as low as reasonably achievable. COMPARISON: None.  HISTORY: ORDERING SYSTEM PROVIDED HISTORY: ataxia TECHNOLOGIST PROVIDED HISTORY: ataxia Decision Support Exception - unselect if not a suspected or confirmed emergency medical condition->Emergency Medical Condition (MA) Reason for Exam: Ataxia FINDINGS: BRAIN/VENTRICLES: Low-attenuation in the anterior left corona radiata toe with large mint of the adjacent horn of the left lateral ventricle consistent with a remote infarct. There are few small remote basal ganglia lacunar infarcts. There is no acute intracranial hemorrhage, mass effect or midline shift. No abnormal extra-axial fluid collection. The gray-white differentiation is maintained without evidence of an acute infarct. There is no evidence of hydrocephalus. ORBITS: The visualized portion of the orbits demonstrate no acute abnormality. There is a right eye prosthesis and there is a remote left orbital medial blowout fracture deformity. SINUSES: The visualized paranasal sinuses and mastoid air cells demonstrate no acute abnormality. SOFT TISSUES/SKULL:  No acute abnormality of the visualized skull or soft tissues. Chronic ischemic changes as above with no acute intracranial abnormality. CT ABDOMEN PELVIS W IV CONTRAST Additional Contrast? None    Result Date: 7/22/2022  EXAMINATION: CT OF THE ABDOMEN AND PELVIS WITH CONTRAST 7/22/2022 8:02 pm TECHNIQUE: CT of the abdomen and pelvis was performed with the administration of intravenous contrast. Multiplanar reformatted images are provided for review. Automated exposure control, iterative reconstruction, and/or weight based adjustment of the mA/kV was utilized to reduce the radiation dose to as low as reasonably achievable. COMPARISON: None. HISTORY: ORDERING SYSTEM PROVIDED HISTORY: intractible N/V / leukocytosis TECHNOLOGIST PROVIDED HISTORY: intractible N/V / leukocytosis Decision Support Exception - unselect if not a suspected or confirmed emergency medical condition->Emergency Medical Condition (MA) Reason for Exam: Intractable nausea and vomiting, leukocytosis FINDINGS: Lower Chest: Heart size is normal.  The visualized lung bases are grossly clear. Organs: The liver, spleen, pancreas, kidneys, gallbladder, and adrenal glands appear normal. GI/Bowel: Diverticulosis of the cecum and of the sigmoid colon.   The stomach and the small and large bowel loops are otherwise normal in and review of medication and discharge plan

## 2022-07-24 NOTE — PLAN OF CARE
Problem: Discharge Planning  Goal: Discharge to home or other facility with appropriate resources  Outcome: Progressing  Flowsheets (Taken 7/24/2022 0805)  Discharge to home or other facility with appropriate resources: Identify barriers to discharge with patient and caregiver     Problem: Safety - Adult  Goal: Free from fall injury  Outcome: Progressing     Problem: Pain  Goal: Verbalizes/displays adequate comfort level or baseline comfort level  Outcome: Progressing     Problem: Skin/Tissue Integrity  Goal: Absence of new skin breakdown  Description: 1. Monitor for areas of redness and/or skin breakdown  2. Assess vascular access sites hourly  3. Every 4-6 hours minimum:  Change oxygen saturation probe site  4. Every 4-6 hours:  If on nasal continuous positive airway pressure, respiratory therapy assess nares and determine need for appliance change or resting period.   Outcome: Progressing

## 2022-07-24 NOTE — PROGRESS NOTES
Physical Therapy  Facility/Department: [facility]  PROGRESSIVE CARE  Rehabilitation Physical Therapy Initial Assessment    NAME: Cristian Machado  : 1960 (58 y.o.)  MRN: [id number]  CODE STATUS: Full Code    Date of Service: 22      Past Medical History:   Diagnosis Date    Acute pancreatitis 2019    Cerebral artery occlusion with cerebral infarction [facility])     History of alcohol use     Hyperlipidemia     Hypertension     Substance abuse ([facility] [address]     Tobacco abuse      Past Surgical History:   Procedure Laterality Date    EYE SURGERY         Chart Reviewed: Yes  Patient assessed for rehabilitation services?: Yes  Family / Caregiver Present: No  Referring Practitioner: Nino Youngblood  Referral Date : 22  Diagnosis: intractable vomiting with nausea    Restrictions:  Restrictions/Precautions: None     SUBJECTIVE  Subjective:  The patinet is feeling much better today and able to tolerate eating       Post Treatment Pain Screening       Prior Level of Function:  Social/Functional History  Lives With: Alone      OBJECTIVE                 ROM       Strength  Strength RLE  Strength RLE: Exception  R Hip Flexion: 3+/5  R Hip Extension: 3+/5  R Hip ABduction: 3+/5  R Hip ADduction: 3+/5  R Knee Flexion: 3+/5  R Knee Extension: 4-/5  R Ankle Dorsiflexion: 3+/5  R Ankle Plantar flexion: 3+/5  Strength LLE  Strength LLE: Exception  L Hip Flexion: 3+/5  L Hip Extension: 3+/5  L Hip ABduction: 3+/5  L Hip ADduction: 3+/5  L Knee Flexion: 3+/5  L Knee Extension: 4-/5  L Ankle Dorsiflexion: 3+/5  L Ankle Plantar Flexion: 3+/5    Quality of Movement            Functional Mobility  Bed mobility  Bridging: Modified independent   Rolling to Left: Modified independent  Rolling to Right: Modified independent  Supine to Sit: Modified independent  Sit to Supine: Modified independent  Scooting: Modified independent  Transfers  Sit to Stand: Contact guard assistance  Stand to sit: Contact guard assistance  Bed to Chair: Contact guard assistance  Stand Pivot Transfers: Contact guard assistance  Comment: unsteady when standing, no complaints of dizziness  Balance  Posture: Good  Sitting - Static: Good  Sitting - Dynamic: Good  Standing - Static: Fair  Standing - Dynamic: Fair;-    Environmental Mobility  Ambulation  WB Status: Full  Ambulation  Surface: level tile  Device: No Device  Assistance: Contact guard assistance  Quality of Gait: unsteady with gait, wide TIARA and change in speeds noted  Distance: 30 feet x 2  Comments: 1 LOB but able to recover himself         ASSESSMENT       Activity Tolerance  Activity Tolerance: Patient limited by fatigue  Activity Tolerance Comments: the patient was fatigued and needed to sit after the 30 feet of walking    Assessment  Assessment: The patient is weak and nsteady limiting his functinoal mobility for return home  Treatment Diagnosis: Weakness and difficulty walking  Therapy Prognosis: Good  Decision Making: Low Complexity    CLINICAL IMPRESSION   The patient is weak and nsteady limiting his functinoal mobility for return home    GOALS  Patient Goals   Patient goals : get stronger to go home  Short Term Goals  Time Frame for Short term goals: 2-3 days  Short term goal 1: Completed sit to stand trasnfers with mod I and no LOB  Short term goal 2: Ambulate 50 feet x 2 with no device and continueous steps and no LOB, mod I  Short term goal 3: Improve strength to 4+/5 in the bialteral LE to assist with gait and trasnfersfor return home  Short term goal 4: independent iw the [facility] for LE strengthening    PLAN OF CARE  Frequency: 1-2 treatment sessions per day, 5-7 days per week  Plan  Plan: 1 time a day 3-6 times a week  Current Treatment Recommendations: Strengthening;Balance training;Functional mobility training;Transfer training;Home exercise program;Therapeutic activities; Neuromuscular re-education;Gait training  Plan Comment: address fatigue and unsteadiness to return home safely    EDUCATION       ELOS: Therapy Time   Individual Concurrent Group Co-treatment   Time In           Time Out           Minutes                     Faisal Reyna, PT, 07/24/22 at 8:55 AM

## 2022-07-25 ENCOUNTER — CARE COORDINATION (OUTPATIENT)
Dept: CASE MANAGEMENT | Age: 62
End: 2022-07-25

## 2022-07-25 NOTE — CARE COORDINATION
[facility] Transitions Initial Follow Up Call    Call within 2 business days of discharge: Yes    Patient: Keke Morrow Patient : 1960   MRN: <[id number]>  Reason for Admission: Generalized weakness  Discharge Date: 22 RARS: No data recorded    Last Discharge [facility]       Date Complaint Diagnosis Description Type Department Provider    22 Emesis Generalized weakness . .. ED to Hosp-Admission (Discharged) ([facility]) Glynn Kong MD; Pop Kwong. .. Spoke with: Jaiden Parker: Joelle Farmer    Attempted to contact V[facility]ctor Adair for initial transitional outreach, but no answer and VM left. Call placed to his mother, Elier Darden and she stated that he was with her. She denied him having any further N/V, diarrhea and no dizziness, just weak and moving slow. She said that he continues with the rao and that he has to empty it frequently. .  Reviewed medications and she stated that he had all his medications, except the nicoderm patch and the albuterol inhaler. She said that he wanted to talk with his PCP about the inhaler before he gets it. She said that they had a call out to the PCP and is waiting on a call back. He will go to a urologist that his PCP recommends. She had no questions or concerns. Non-face-to-face services provided:  Obtained and reviewed discharge summary and/or continuity of care documents  Assessment and support for treatment adherence and medication management-review    Transitions of Care Initial Call    Was this an external facility discharge? No Discharge Facility: [facility] to be reviewed by the provider   Additional needs identified to be addressed with provider: No  none             Method of communication with provider : none    Advance Care Planning:   Does patient have an Advance Directive:  no ACP doc, decision makers verified .      Care Transition Nurse contacted the family by telephone to perform post hospital discharge assessment. Verified name and  with family as identifiers. Provided introduction to self, and explanation of the CTN role. CTN reviewed discharge instructions, medical action plan and red flags with family who verbalized understanding. Family given an opportunity to ask questions and does not have any further questions or concerns at this time. Were discharge instructions available to patient? Yes. Reviewed appropriate site of care based on symptoms and resources available to patient including: PCP  Specialist  When to call 911. The family agrees to contact the PCP office for questions related to their healthcare. Medication review was performed with family, who verbalizes understanding of administration of home medications. Was patient discharged with a pulse oximeter? no    CTN provided contact information. No further follow-up call indicated based on severity of symptoms and risk factors. Plan for next call:  Final call Pt with non [facility] [employer] provider        Care Transitions 24 Hour Call    Do you have a copy of your discharge instructions?: Yes  Do you have all of your prescriptions and are they filled?: No  Have you been contacted by a [id number] Pharmacist?: No  Have you scheduled your follow up appointment?: No  Do you feel like you have everything you need to keep you well at home?: Yes  Care Transitions Interventions         Follow Up  No future appointments.     Isiah Posadas RN

## 2022-07-26 ENCOUNTER — TELEPHONE (OUTPATIENT)
Dept: SURGERY | Age: 62
End: 2022-07-26

## 2022-07-26 NOTE — TELEPHONE ENCOUNTER
Patient has a rao that needs to be removed. He was in the hospital on 7/22/22. He is staying with his mother.  Please call him at [phone]

## 2022-07-27 ENCOUNTER — OFFICE VISIT (OUTPATIENT)
Dept: UROLOGY | Age: 62
End: 2022-07-27
Payer: COMMERCIAL

## 2022-07-27 VITALS
HEART RATE: 74 BPM | SYSTOLIC BLOOD PRESSURE: 134 MMHG | DIASTOLIC BLOOD PRESSURE: 70 MMHG | BODY MASS INDEX: 22.36 KG/M2 | WEIGHT: 151 LBS | HEIGHT: 69 IN

## 2022-07-27 DIAGNOSIS — R33.9 URINARY RETENTION: Primary | ICD-10-CM

## 2022-07-27 DIAGNOSIS — N13.8 BPH WITH OBSTRUCTION/LOWER URINARY TRACT SYMPTOMS: ICD-10-CM

## 2022-07-27 DIAGNOSIS — N40.1 BPH WITH OBSTRUCTION/LOWER URINARY TRACT SYMPTOMS: ICD-10-CM

## 2022-07-27 PROCEDURE — 99204 OFFICE O/P NEW MOD 45 MIN: CPT | Performed by: UROLOGY

## 2022-07-27 RX ORDER — TAMSULOSIN HYDROCHLORIDE 0.4 MG/1
0.4 CAPSULE ORAL DAILY
Qty: 90 CAPSULE | Refills: 1 | Status: SHIPPED | OUTPATIENT
Start: 2022-07-27 | End: 2022-09-07 | Stop reason: SDUPTHER

## 2022-07-27 NOTE — PROGRESS NOTES
Todd Montaño MD   Urology Clinic office Visit      Patient:  Dillon Briceno  YOB: 1960  Date: 7/27/2022    HISTORY OF PRESENT ILLNESS:   The patient is a 58 y.o. male who presents today for evaluation of the following problem(s):      1. Urinary retention    2. BPH with obstruction/lower urinary tract symptoms           Overall the problem(s) : are worsening. Associated Symptoms: No dysuria, gross hematuria. Pain Severity:      Summary of old records: N/A  (Patient's old records, notes and chart reviewed and summarized above.)      Onset weeks  Admitted for viral illness  Had urinary retention  Managed with rao   Started on flomax  Severity is described as moderate. The course of symptoms over time is acute. Alleviating factors: none  Worsening factors: none  Lower urinary tract symptoms: no previous urinary issues    I independently reviewed and verified the images and reports from:    CT HEAD WO CONTRAST    Result Date: 7/22/2022  EXAMINATION: CT OF THE HEAD WITHOUT CONTRAST  7/22/2022 9:01 pm TECHNIQUE: CT of the head was performed without the administration of intravenous contrast. Automated exposure control, iterative reconstruction, and/or weight based adjustment of the mA/kV was utilized to reduce the radiation dose to as low as reasonably achievable. COMPARISON: None. HISTORY: ORDERING SYSTEM PROVIDED HISTORY: ataxia TECHNOLOGIST PROVIDED HISTORY: ataxia Decision Support Exception - unselect if not a suspected or confirmed emergency medical condition->Emergency Medical Condition (MA) Reason for Exam: Ataxia FINDINGS: BRAIN/VENTRICLES: Low-attenuation in the anterior left corona radiata toe with large mint of the adjacent horn of the left lateral ventricle consistent with a remote infarct. There are few small remote basal ganglia lacunar infarcts. There is no acute intracranial hemorrhage, mass effect or midline shift. No abnormal extra-axial fluid collection.   The gray-white differentiation is maintained without evidence of an acute infarct. There is no evidence of hydrocephalus. ORBITS: The visualized portion of the orbits demonstrate no acute abnormality. There is a right eye prosthesis and there is a remote left orbital medial blowout fracture deformity. SINUSES: The visualized paranasal sinuses and mastoid air cells demonstrate no acute abnormality. SOFT TISSUES/SKULL:  No acute abnormality of the visualized skull or soft tissues. Chronic ischemic changes as above with no acute intracranial abnormality. CT ABDOMEN PELVIS W IV CONTRAST Additional Contrast? None    Result Date: 7/22/2022  EXAMINATION: CT OF THE ABDOMEN AND PELVIS WITH CONTRAST 7/22/2022 8:02 pm TECHNIQUE: CT of the abdomen and pelvis was performed with the administration of intravenous contrast. Multiplanar reformatted images are provided for review. Automated exposure control, iterative reconstruction, and/or weight based adjustment of the mA/kV was utilized to reduce the radiation dose to as low as reasonably achievable. COMPARISON: None. HISTORY: ORDERING SYSTEM PROVIDED HISTORY: intractible N/V / leukocytosis TECHNOLOGIST PROVIDED HISTORY: intractible N/V / leukocytosis Decision Support Exception - unselect if not a suspected or confirmed emergency medical condition->Emergency Medical Condition (MA) Reason for Exam: Intractable nausea and vomiting, leukocytosis FINDINGS: Lower Chest: Heart size is normal.  The visualized lung bases are grossly clear. Organs: The liver, spleen, pancreas, kidneys, gallbladder, and adrenal glands appear normal. GI/Bowel: Diverticulosis of the cecum and of the sigmoid colon. The stomach and the small and large bowel loops are otherwise normal in caliber, contour, and morphology, without acute abnormality. No dilated loops or areas of bowel wall thickening. The appendix is normal. Peritoneum/Retroperitoneum: There is no free fluid or extraluminal gas.   No enlarged or Psych: Mood and affect normal.  Skin: Normal  Lungs: Respiratory effort normal  Cardiovascular:  Normal peripheral pulses  Abdomen: Soft, non-tender, non-distended   Bladder non-tender and not distended. Lymphatics: no palpable lymphadenopathy  Gait is within normal limits  Musculoskeletal: Normal range of motion       Assessment and Plan      1. Urinary retention    2. BPH with obstruction/lower urinary tract symptoms         Remove catheter  Continue flomax daily    Follow up 4-6 weeks with PVR check  Check PSA in future      Prescriptions Ordered:  Orders Placed This Encounter   Medications    tamsulosin (FLOMAX) 0.4 MG capsule     Sig: Take 1 capsule by mouth in the morning. Dispense:  90 capsule     Refill:  1        Orders Placed:  No orders of the defined types were placed in this encounter. Erica Ramírez M.D, MD    No follow-ups on file.       Stanley Devlin MD  [facility] Urology

## 2022-09-07 ENCOUNTER — OFFICE VISIT (OUTPATIENT)
Dept: UROLOGY | Age: 62
End: 2022-09-07
Payer: COMMERCIAL

## 2022-09-07 VITALS
HEIGHT: 69 IN | BODY MASS INDEX: 22.3 KG/M2 | DIASTOLIC BLOOD PRESSURE: 62 MMHG | SYSTOLIC BLOOD PRESSURE: 116 MMHG | HEART RATE: 71 BPM

## 2022-09-07 DIAGNOSIS — R33.9 URINARY RETENTION: Primary | ICD-10-CM

## 2022-09-07 DIAGNOSIS — N40.1 BPH WITH OBSTRUCTION/LOWER URINARY TRACT SYMPTOMS: ICD-10-CM

## 2022-09-07 DIAGNOSIS — N13.8 BPH WITH OBSTRUCTION/LOWER URINARY TRACT SYMPTOMS: ICD-10-CM

## 2022-09-07 DIAGNOSIS — R97.20 ELEVATED PSA: ICD-10-CM

## 2022-09-07 PROCEDURE — 99213 OFFICE O/P EST LOW 20 MIN: CPT | Performed by: UROLOGY

## 2022-09-07 PROCEDURE — 51798 US URINE CAPACITY MEASURE: CPT | Performed by: UROLOGY

## 2022-09-07 RX ORDER — TAMSULOSIN HYDROCHLORIDE 0.4 MG/1
0.4 CAPSULE ORAL DAILY
Qty: 90 CAPSULE | Refills: 1 | Status: SHIPPED | OUTPATIENT
Start: 2022-09-07 | End: 2022-12-06

## 2022-09-07 NOTE — PROGRESS NOTES
with Emmanuel Patel MD   Medication Sig Dispense Refill    tamsulosin (FLOMAX) 0.4 MG capsule Take 1 capsule by mouth daily 90 capsule 1    glimepiride (AMARYL) 2 MG tablet Take 1 mg by mouth every morning (before breakfast)      metFORMIN (GLUCOPHAGE) 1000 MG tablet Take 1,000 mg by mouth in the morning and at bedtime      Cholecalciferol (VITAMIN D3) 1000 units CAPS Take 1,000 Units by mouth daily      lisinopril (PRINIVIL;ZESTRIL) 10 MG tablet Take 10 mg by mouth      lovastatin (MEVACOR) 20 MG tablet Take 20 mg by mouth         Patient has no known allergies. Social History     Tobacco Use   Smoking Status Every Day    Packs/day: 1.00    Years: 44.00    Pack years: 44.00    Types: Cigarettes    Start date: 6/10/1978   Smokeless Tobacco Never       Social History     Substance and Sexual Activity   Alcohol Use Yes    Alcohol/week: 35.0 standard drinks    Types: 35 Cans of beer per week       REVIEW OF SYSTEMS:  Constitutional: negative  Eyes: negative  Respiratory: negative  Cardiovascular: negative  Gastrointestinal: negative  Musculoskeletal: negative  Genitourinary: negative except for what is in HPI  Skin: negative   Neurological: negative  Hematological/Lymphatic: negative  Psychological: negative    Physical Exam:    This a 58 y.o. male   Vitals:    09/07/22 1139   BP: 116/62   Pulse: 71     Constitutional: Patient in no acute distress; Neuro: alert and oriented to person place and time. Assessment and Plan      1. Urinary retention    2. BPH with obstruction/lower urinary tract symptoms    3. Elevated PSA         Removed catheter last visit  My recommendation is to continue flomax daily  He would like to stop taking it, I discussed risks of this    PVR today 153cc.   Repeat PSA in 6-8 weeks; fu to review and repeat PVR      Prescriptions Ordered:  Orders Placed This Encounter   Medications    tamsulosin (FLOMAX) 0.4 MG capsule     Sig: Take 1 capsule by mouth daily     Dispense:  90 capsule Refill:  1          Orders Placed:  Orders Placed This Encounter   Procedures    PSA, Diagnostic     Standing Status:   Future     Standing Expiration Date:   9/7/2023    IN MEASUREMENT,POST-VOID Tara Rogers M.D, MD    No follow-ups on file.       Yahir Good MD  [facility] [[email or web address]] Urology